# Patient Record
Sex: MALE | Race: WHITE | NOT HISPANIC OR LATINO | Employment: FULL TIME | URBAN - METROPOLITAN AREA
[De-identification: names, ages, dates, MRNs, and addresses within clinical notes are randomized per-mention and may not be internally consistent; named-entity substitution may affect disease eponyms.]

---

## 2019-02-26 ENCOUNTER — TRANSFERRED RECORDS (OUTPATIENT)
Dept: HEALTH INFORMATION MANAGEMENT | Facility: CLINIC | Age: 44
End: 2019-02-26

## 2019-05-31 ENCOUNTER — APPOINTMENT (OUTPATIENT)
Dept: GENERAL RADIOLOGY | Facility: CLINIC | Age: 44
End: 2019-05-31
Attending: EMERGENCY MEDICINE
Payer: COMMERCIAL

## 2019-05-31 ENCOUNTER — HOSPITAL ENCOUNTER (INPATIENT)
Facility: CLINIC | Age: 44
LOS: 2 days | Discharge: HOME | End: 2019-06-02
Attending: EMERGENCY MEDICINE | Admitting: INTERNAL MEDICINE
Payer: COMMERCIAL

## 2019-05-31 DIAGNOSIS — I21.19 ST ELEVATION MYOCARDIAL INFARCTION (STEMI) INVOLVING OTHER CORONARY ARTERY OF INFERIOR WALL (H): ICD-10-CM

## 2019-05-31 DIAGNOSIS — I21.11 STEMI INVOLVING RIGHT CORONARY ARTERY (H): Primary | ICD-10-CM

## 2019-05-31 PROBLEM — I21.3 STEMI (ST ELEVATION MYOCARDIAL INFARCTION) (H): Status: ACTIVE | Noted: 2019-05-31

## 2019-05-31 LAB
ALBUMIN SERPL-MCNC: 4.2 G/DL (ref 3.4–5)
ALP SERPL-CCNC: 89 U/L (ref 40–150)
ALT SERPL W P-5'-P-CCNC: 61 U/L (ref 0–70)
ANION GAP SERPL CALCULATED.3IONS-SCNC: 3 MMOL/L (ref 3–14)
AST SERPL W P-5'-P-CCNC: 33 U/L (ref 0–45)
BASOPHILS # BLD AUTO: 0 10E9/L (ref 0–0.2)
BASOPHILS NFR BLD AUTO: 0.6 %
BILIRUB SERPL-MCNC: 0.8 MG/DL (ref 0.2–1.3)
BUN SERPL-MCNC: 15 MG/DL (ref 7–30)
CALCIUM SERPL-MCNC: 8.6 MG/DL (ref 8.5–10.1)
CATH EF QUANTITATIVE: 65 %
CHLORIDE SERPL-SCNC: 107 MMOL/L (ref 94–109)
CHOLEST SERPL-MCNC: 219 MG/DL
CO2 SERPL-SCNC: 30 MMOL/L (ref 20–32)
CREAT SERPL-MCNC: 0.94 MG/DL (ref 0.66–1.25)
DIFFERENTIAL METHOD BLD: NORMAL
EOSINOPHIL # BLD AUTO: 0.2 10E9/L (ref 0–0.7)
EOSINOPHIL NFR BLD AUTO: 2.3 %
ERYTHROCYTE [DISTWIDTH] IN BLOOD BY AUTOMATED COUNT: 12.1 % (ref 10–15)
GFR SERPL CREATININE-BSD FRML MDRD: >90 ML/MIN/{1.73_M2}
GLUCOSE SERPL-MCNC: 86 MG/DL (ref 70–99)
HCT VFR BLD AUTO: 41.3 % (ref 40–53)
HDLC SERPL-MCNC: 37 MG/DL
HGB BLD-MCNC: 14.7 G/DL (ref 13.3–17.7)
IMM GRANULOCYTES # BLD: 0 10E9/L (ref 0–0.4)
IMM GRANULOCYTES NFR BLD: 0.3 %
INTERPRETATION ECG - MUSE: NORMAL
INTERPRETATION ECG - MUSE: NORMAL
KCT BLD-ACNC: 176 SEC (ref 75–150)
KCT BLD-ACNC: 221 SEC (ref 75–150)
KCT BLD-ACNC: 241 SEC (ref 75–150)
LDLC SERPL CALC-MCNC: 141 MG/DL
LYMPHOCYTES # BLD AUTO: 2.2 10E9/L (ref 0.8–5.3)
LYMPHOCYTES NFR BLD AUTO: 33.8 %
MCH RBC QN AUTO: 30.4 PG (ref 26.5–33)
MCHC RBC AUTO-ENTMCNC: 35.6 G/DL (ref 31.5–36.5)
MCV RBC AUTO: 85 FL (ref 78–100)
MONOCYTES # BLD AUTO: 0.5 10E9/L (ref 0–1.3)
MONOCYTES NFR BLD AUTO: 8.3 %
NEUTROPHILS # BLD AUTO: 3.5 10E9/L (ref 1.6–8.3)
NEUTROPHILS NFR BLD AUTO: 54.7 %
NONHDLC SERPL-MCNC: 182 MG/DL
NRBC # BLD AUTO: 0 10*3/UL
NRBC BLD AUTO-RTO: 0 /100
PLATELET # BLD AUTO: 254 10E9/L (ref 150–450)
POTASSIUM SERPL-SCNC: 4 MMOL/L (ref 3.4–5.3)
PROT SERPL-MCNC: 8 G/DL (ref 6.8–8.8)
RBC # BLD AUTO: 4.84 10E12/L (ref 4.4–5.9)
SODIUM SERPL-SCNC: 140 MMOL/L (ref 133–144)
TRIGL SERPL-MCNC: 203 MG/DL
TROPONIN I SERPL-MCNC: 0.06 UG/L (ref 0–0.04)
TROPONIN I SERPL-MCNC: 0.77 UG/L (ref 0–0.04)
WBC # BLD AUTO: 6.4 10E9/L (ref 4–11)

## 2019-05-31 PROCEDURE — C1894 INTRO/SHEATH, NON-LASER: HCPCS | Performed by: INTERNAL MEDICINE

## 2019-05-31 PROCEDURE — 84484 ASSAY OF TROPONIN QUANT: CPT | Performed by: INTERNAL MEDICINE

## 2019-05-31 PROCEDURE — 25000132 ZZH RX MED GY IP 250 OP 250 PS 637: Performed by: EMERGENCY MEDICINE

## 2019-05-31 PROCEDURE — 96375 TX/PRO/DX INJ NEW DRUG ADDON: CPT

## 2019-05-31 PROCEDURE — 80053 COMPREHEN METABOLIC PANEL: CPT | Performed by: EMERGENCY MEDICINE

## 2019-05-31 PROCEDURE — 25000128 H RX IP 250 OP 636: Performed by: INTERNAL MEDICINE

## 2019-05-31 PROCEDURE — 93005 ELECTROCARDIOGRAM TRACING: CPT

## 2019-05-31 PROCEDURE — 96374 THER/PROPH/DIAG INJ IV PUSH: CPT

## 2019-05-31 PROCEDURE — 80061 LIPID PANEL: CPT | Performed by: EMERGENCY MEDICINE

## 2019-05-31 PROCEDURE — 25000128 H RX IP 250 OP 636

## 2019-05-31 PROCEDURE — C1874 STENT, COATED/COV W/DEL SYS: HCPCS | Performed by: INTERNAL MEDICINE

## 2019-05-31 PROCEDURE — C1887 CATHETER, GUIDING: HCPCS | Performed by: INTERNAL MEDICINE

## 2019-05-31 PROCEDURE — 99153 MOD SED SAME PHYS/QHP EA: CPT | Performed by: INTERNAL MEDICINE

## 2019-05-31 PROCEDURE — 85347 COAGULATION TIME ACTIVATED: CPT

## 2019-05-31 PROCEDURE — 99223 1ST HOSP IP/OBS HIGH 75: CPT | Mod: AI | Performed by: INTERNAL MEDICINE

## 2019-05-31 PROCEDURE — 25800030 ZZH RX IP 258 OP 636: Performed by: INTERNAL MEDICINE

## 2019-05-31 PROCEDURE — 25000125 ZZHC RX 250: Performed by: EMERGENCY MEDICINE

## 2019-05-31 PROCEDURE — 4A023N7 MEASUREMENT OF CARDIAC SAMPLING AND PRESSURE, LEFT HEART, PERCUTANEOUS APPROACH: ICD-10-PCS | Performed by: INTERNAL MEDICINE

## 2019-05-31 PROCEDURE — 93458 L HRT ARTERY/VENTRICLE ANGIO: CPT | Performed by: INTERNAL MEDICINE

## 2019-05-31 PROCEDURE — 25000125 ZZHC RX 250: Performed by: INTERNAL MEDICINE

## 2019-05-31 PROCEDURE — 21000001 ZZH R&B HEART CARE

## 2019-05-31 PROCEDURE — 27210794 ZZH OR GENERAL SUPPLY STERILE: Performed by: INTERNAL MEDICINE

## 2019-05-31 PROCEDURE — 93005 ELECTROCARDIOGRAM TRACING: CPT | Mod: 76

## 2019-05-31 PROCEDURE — 85025 COMPLETE CBC W/AUTO DIFF WBC: CPT | Performed by: EMERGENCY MEDICINE

## 2019-05-31 PROCEDURE — 71045 X-RAY EXAM CHEST 1 VIEW: CPT

## 2019-05-31 PROCEDURE — 84484 ASSAY OF TROPONIN QUANT: CPT | Performed by: EMERGENCY MEDICINE

## 2019-05-31 PROCEDURE — B2151ZZ FLUOROSCOPY OF LEFT HEART USING LOW OSMOLAR CONTRAST: ICD-10-PCS | Performed by: INTERNAL MEDICINE

## 2019-05-31 PROCEDURE — 25000128 H RX IP 250 OP 636: Performed by: EMERGENCY MEDICINE

## 2019-05-31 PROCEDURE — 25000132 ZZH RX MED GY IP 250 OP 250 PS 637: Performed by: INTERNAL MEDICINE

## 2019-05-31 PROCEDURE — 25000125 ZZHC RX 250

## 2019-05-31 PROCEDURE — 99291 CRITICAL CARE FIRST HOUR: CPT | Mod: 25 | Performed by: INTERNAL MEDICINE

## 2019-05-31 PROCEDURE — C9606 PERC D-E COR REVASC W AMI S: HCPCS | Performed by: INTERNAL MEDICINE

## 2019-05-31 PROCEDURE — B2111ZZ FLUOROSCOPY OF MULTIPLE CORONARY ARTERIES USING LOW OSMOLAR CONTRAST: ICD-10-PCS | Performed by: INTERNAL MEDICINE

## 2019-05-31 PROCEDURE — 99152 MOD SED SAME PHYS/QHP 5/>YRS: CPT | Performed by: INTERNAL MEDICINE

## 2019-05-31 PROCEDURE — C1769 GUIDE WIRE: HCPCS | Performed by: INTERNAL MEDICINE

## 2019-05-31 PROCEDURE — 93010 ELECTROCARDIOGRAM REPORT: CPT | Performed by: INTERNAL MEDICINE

## 2019-05-31 PROCEDURE — C1725 CATH, TRANSLUMIN NON-LASER: HCPCS | Performed by: INTERNAL MEDICINE

## 2019-05-31 PROCEDURE — 36415 COLL VENOUS BLD VENIPUNCTURE: CPT | Performed by: INTERNAL MEDICINE

## 2019-05-31 PROCEDURE — 027034Z DILATION OF CORONARY ARTERY, ONE ARTERY WITH DRUG-ELUTING INTRALUMINAL DEVICE, PERCUTANEOUS APPROACH: ICD-10-PCS | Performed by: INTERNAL MEDICINE

## 2019-05-31 PROCEDURE — 99285 EMERGENCY DEPT VISIT HI MDM: CPT | Mod: 25

## 2019-05-31 DEVICE — STENT SYNERGY DRUG ELUTING 4.00X32MM  H7493926032400: Type: IMPLANTABLE DEVICE | Status: FUNCTIONAL

## 2019-05-31 RX ORDER — AMOXICILLIN 250 MG
2 CAPSULE ORAL 2 TIMES DAILY PRN
Status: DISCONTINUED | OUTPATIENT
Start: 2019-05-31 | End: 2019-06-02 | Stop reason: HOSPADM

## 2019-05-31 RX ORDER — MORPHINE SULFATE 2 MG/ML
1 INJECTION, SOLUTION INTRAMUSCULAR; INTRAVENOUS
Status: DISCONTINUED | OUTPATIENT
Start: 2019-05-31 | End: 2019-06-02 | Stop reason: HOSPADM

## 2019-05-31 RX ORDER — CARVEDILOL 12.5 MG/1
12.5 TABLET ORAL 2 TIMES DAILY
Status: DISCONTINUED | OUTPATIENT
Start: 2019-05-31 | End: 2019-06-01

## 2019-05-31 RX ORDER — IOPAMIDOL 755 MG/ML
INJECTION, SOLUTION INTRAVASCULAR
Status: DISCONTINUED | OUTPATIENT
Start: 2019-05-31 | End: 2019-05-31 | Stop reason: HOSPADM

## 2019-05-31 RX ORDER — ASPIRIN 81 MG/1
324 TABLET, CHEWABLE ORAL ONCE
Status: COMPLETED | OUTPATIENT
Start: 2019-05-31 | End: 2019-05-31

## 2019-05-31 RX ORDER — FENTANYL CITRATE 50 UG/ML
INJECTION, SOLUTION INTRAMUSCULAR; INTRAVENOUS
Status: DISCONTINUED | OUTPATIENT
Start: 2019-05-31 | End: 2019-05-31 | Stop reason: HOSPADM

## 2019-05-31 RX ORDER — ROSUVASTATIN CALCIUM 20 MG/1
40 TABLET, COATED ORAL DAILY
Status: DISCONTINUED | OUTPATIENT
Start: 2019-05-31 | End: 2019-06-02 | Stop reason: HOSPADM

## 2019-05-31 RX ORDER — FLUMAZENIL 0.1 MG/ML
0.2 INJECTION, SOLUTION INTRAVENOUS
Status: ACTIVE | OUTPATIENT
Start: 2019-05-31 | End: 2019-06-01

## 2019-05-31 RX ORDER — NITROGLYCERIN 0.4 MG/1
0.4 TABLET SUBLINGUAL EVERY 5 MIN PRN
Status: DISCONTINUED | OUTPATIENT
Start: 2019-05-31 | End: 2019-05-31

## 2019-05-31 RX ORDER — METOPROLOL TARTRATE 1 MG/ML
5 INJECTION, SOLUTION INTRAVENOUS ONCE
Status: COMPLETED | OUTPATIENT
Start: 2019-05-31 | End: 2019-05-31

## 2019-05-31 RX ORDER — POTASSIUM CL/LIDO/0.9 % NACL 10MEQ/0.1L
10 INTRAVENOUS SOLUTION, PIGGYBACK (ML) INTRAVENOUS
Status: DISCONTINUED | OUTPATIENT
Start: 2019-05-31 | End: 2019-06-02 | Stop reason: HOSPADM

## 2019-05-31 RX ORDER — LABETALOL 20 MG/4 ML (5 MG/ML) INTRAVENOUS SYRINGE
10
Status: DISCONTINUED | OUTPATIENT
Start: 2019-05-31 | End: 2019-06-02 | Stop reason: HOSPADM

## 2019-05-31 RX ORDER — ESCITALOPRAM OXALATE 10 MG/1
20 TABLET ORAL DAILY
Status: DISCONTINUED | OUTPATIENT
Start: 2019-06-01 | End: 2019-06-02 | Stop reason: HOSPADM

## 2019-05-31 RX ORDER — POLYETHYLENE GLYCOL 3350 17 G/17G
17 POWDER, FOR SOLUTION ORAL DAILY PRN
Status: DISCONTINUED | OUTPATIENT
Start: 2019-05-31 | End: 2019-06-02 | Stop reason: HOSPADM

## 2019-05-31 RX ORDER — HEPARIN SODIUM 1000 [USP'U]/ML
INJECTION, SOLUTION INTRAVENOUS; SUBCUTANEOUS
Status: DISCONTINUED | OUTPATIENT
Start: 2019-05-31 | End: 2019-05-31 | Stop reason: HOSPADM

## 2019-05-31 RX ORDER — POTASSIUM CHLORIDE 1.5 G/1.58G
20-40 POWDER, FOR SOLUTION ORAL
Status: DISCONTINUED | OUTPATIENT
Start: 2019-05-31 | End: 2019-06-02 | Stop reason: HOSPADM

## 2019-05-31 RX ORDER — POTASSIUM CHLORIDE 7.45 MG/ML
10 INJECTION INTRAVENOUS
Status: DISCONTINUED | OUTPATIENT
Start: 2019-05-31 | End: 2019-06-02 | Stop reason: HOSPADM

## 2019-05-31 RX ORDER — ACETAMINOPHEN 325 MG/1
650 TABLET ORAL EVERY 4 HOURS PRN
Status: DISCONTINUED | OUTPATIENT
Start: 2019-05-31 | End: 2019-06-02 | Stop reason: HOSPADM

## 2019-05-31 RX ORDER — ASPIRIN 81 MG/1
81 TABLET ORAL DAILY
Status: DISCONTINUED | OUTPATIENT
Start: 2019-06-01 | End: 2019-06-02 | Stop reason: HOSPADM

## 2019-05-31 RX ORDER — HYDROCODONE BITARTRATE AND ACETAMINOPHEN 5; 325 MG/1; MG/1
1-2 TABLET ORAL EVERY 4 HOURS PRN
Status: DISCONTINUED | OUTPATIENT
Start: 2019-05-31 | End: 2019-06-02 | Stop reason: HOSPADM

## 2019-05-31 RX ORDER — POTASSIUM CHLORIDE 1500 MG/1
20-40 TABLET, EXTENDED RELEASE ORAL
Status: DISCONTINUED | OUTPATIENT
Start: 2019-05-31 | End: 2019-06-02 | Stop reason: HOSPADM

## 2019-05-31 RX ORDER — METOPROLOL TARTRATE 1 MG/ML
INJECTION, SOLUTION INTRAVENOUS
Status: COMPLETED
Start: 2019-05-31 | End: 2019-05-31

## 2019-05-31 RX ORDER — METOPROLOL TARTRATE 1 MG/ML
INJECTION, SOLUTION INTRAVENOUS
Status: DISCONTINUED | OUTPATIENT
Start: 2019-05-31 | End: 2019-05-31 | Stop reason: HOSPADM

## 2019-05-31 RX ORDER — NITROGLYCERIN 5 MG/ML
VIAL (ML) INTRAVENOUS
Status: DISCONTINUED | OUTPATIENT
Start: 2019-05-31 | End: 2019-05-31 | Stop reason: HOSPADM

## 2019-05-31 RX ORDER — LISINOPRIL 10 MG/1
10 TABLET ORAL
Status: DISCONTINUED | OUTPATIENT
Start: 2019-05-31 | End: 2019-06-01

## 2019-05-31 RX ORDER — AMOXICILLIN 250 MG
1 CAPSULE ORAL 2 TIMES DAILY PRN
Status: DISCONTINUED | OUTPATIENT
Start: 2019-05-31 | End: 2019-06-02 | Stop reason: HOSPADM

## 2019-05-31 RX ORDER — NALOXONE HYDROCHLORIDE 0.4 MG/ML
.1-.4 INJECTION, SOLUTION INTRAMUSCULAR; INTRAVENOUS; SUBCUTANEOUS
Status: DISCONTINUED | OUTPATIENT
Start: 2019-05-31 | End: 2019-05-31

## 2019-05-31 RX ORDER — OXYCODONE HYDROCHLORIDE 5 MG/1
5-10 TABLET ORAL
Status: DISCONTINUED | OUTPATIENT
Start: 2019-05-31 | End: 2019-05-31

## 2019-05-31 RX ORDER — NALOXONE HYDROCHLORIDE 0.4 MG/ML
.2-.4 INJECTION, SOLUTION INTRAMUSCULAR; INTRAVENOUS; SUBCUTANEOUS
Status: DISCONTINUED | OUTPATIENT
Start: 2019-05-31 | End: 2019-05-31

## 2019-05-31 RX ORDER — POTASSIUM CHLORIDE 29.8 MG/ML
20 INJECTION INTRAVENOUS
Status: DISCONTINUED | OUTPATIENT
Start: 2019-05-31 | End: 2019-05-31

## 2019-05-31 RX ORDER — VERAPAMIL HYDROCHLORIDE 2.5 MG/ML
INJECTION, SOLUTION INTRAVENOUS
Status: DISCONTINUED | OUTPATIENT
Start: 2019-05-31 | End: 2019-05-31 | Stop reason: HOSPADM

## 2019-05-31 RX ORDER — FENTANYL CITRATE 50 UG/ML
25-50 INJECTION, SOLUTION INTRAMUSCULAR; INTRAVENOUS
Status: ACTIVE | OUTPATIENT
Start: 2019-05-31 | End: 2019-06-01

## 2019-05-31 RX ORDER — ONDANSETRON 4 MG/1
4 TABLET, ORALLY DISINTEGRATING ORAL EVERY 6 HOURS PRN
Status: DISCONTINUED | OUTPATIENT
Start: 2019-05-31 | End: 2019-06-02 | Stop reason: HOSPADM

## 2019-05-31 RX ORDER — ONDANSETRON 2 MG/ML
4 INJECTION INTRAMUSCULAR; INTRAVENOUS EVERY 6 HOURS PRN
Status: DISCONTINUED | OUTPATIENT
Start: 2019-05-31 | End: 2019-06-02 | Stop reason: HOSPADM

## 2019-05-31 RX ORDER — ATROPINE SULFATE 0.1 MG/ML
0.5 INJECTION INTRAVENOUS EVERY 5 MIN PRN
Status: ACTIVE | OUTPATIENT
Start: 2019-05-31 | End: 2019-06-01

## 2019-05-31 RX ORDER — NALOXONE HYDROCHLORIDE 0.4 MG/ML
.1-.4 INJECTION, SOLUTION INTRAMUSCULAR; INTRAVENOUS; SUBCUTANEOUS
Status: DISCONTINUED | OUTPATIENT
Start: 2019-05-31 | End: 2019-06-02 | Stop reason: HOSPADM

## 2019-05-31 RX ORDER — SODIUM CHLORIDE 9 MG/ML
INJECTION, SOLUTION INTRAVENOUS CONTINUOUS
Status: ACTIVE | OUTPATIENT
Start: 2019-05-31 | End: 2019-06-01

## 2019-05-31 RX ORDER — PROCHLORPERAZINE MALEATE 5 MG
10 TABLET ORAL EVERY 6 HOURS PRN
Status: DISCONTINUED | OUTPATIENT
Start: 2019-05-31 | End: 2019-06-02 | Stop reason: HOSPADM

## 2019-05-31 RX ORDER — PROCHLORPERAZINE 25 MG
25 SUPPOSITORY, RECTAL RECTAL EVERY 12 HOURS PRN
Status: DISCONTINUED | OUTPATIENT
Start: 2019-05-31 | End: 2019-06-02 | Stop reason: HOSPADM

## 2019-05-31 RX ORDER — NITROGLYCERIN 0.4 MG/1
0.4 TABLET SUBLINGUAL EVERY 5 MIN PRN
Status: DISCONTINUED | OUTPATIENT
Start: 2019-05-31 | End: 2019-06-02 | Stop reason: HOSPADM

## 2019-05-31 RX ADMIN — HEPARIN SODIUM 1000 UNITS/HR: 10000 INJECTION, SOLUTION INTRAVENOUS at 18:11

## 2019-05-31 RX ADMIN — ROSUVASTATIN CALCIUM 40 MG: 20 TABLET, FILM COATED ORAL at 20:57

## 2019-05-31 RX ADMIN — LISINOPRIL 10 MG: 10 TABLET ORAL at 20:57

## 2019-05-31 RX ADMIN — ASPIRIN 81 MG 324 MG: 81 TABLET ORAL at 17:44

## 2019-05-31 RX ADMIN — METOPROLOL TARTRATE 5 MG: 5 INJECTION, SOLUTION INTRAVENOUS at 18:11

## 2019-05-31 RX ADMIN — HEPARIN SODIUM 7000 UNITS: 1000 INJECTION, SOLUTION INTRAVENOUS; SUBCUTANEOUS at 17:51

## 2019-05-31 RX ADMIN — CARVEDILOL 12.5 MG: 12.5 TABLET, FILM COATED ORAL at 20:57

## 2019-05-31 RX ADMIN — METOPROLOL TARTRATE 5 MG: 1 INJECTION, SOLUTION INTRAVENOUS at 18:00

## 2019-05-31 RX ADMIN — TICAGRELOR 180 MG: 90 TABLET ORAL at 17:49

## 2019-05-31 RX ADMIN — METOPROLOL TARTRATE 5 MG: 5 INJECTION, SOLUTION INTRAVENOUS at 18:00

## 2019-05-31 ASSESSMENT — ACTIVITIES OF DAILY LIVING (ADL)
COGNITION: 0 - NO COGNITION ISSUES REPORTED
TRANSFERRING: 0-->INDEPENDENT
TOILETING: 0-->INDEPENDENT
AMBULATION: 0-->INDEPENDENT
SWALLOWING: 0-->SWALLOWS FOODS/LIQUIDS WITHOUT DIFFICULTY
BATHING: 0-->INDEPENDENT
RETIRED_EATING: 0-->INDEPENDENT
FALL_HISTORY_WITHIN_LAST_SIX_MONTHS: NO
DRESS: 0-->INDEPENDENT
RETIRED_COMMUNICATION: 0-->UNDERSTANDS/COMMUNICATES WITHOUT DIFFICULTY

## 2019-05-31 ASSESSMENT — MIFFLIN-ST. JEOR: SCORE: 1925.04

## 2019-05-31 ASSESSMENT — ENCOUNTER SYMPTOMS
COUGH: 0
NAUSEA: 0
SHORTNESS OF BREATH: 0
CHEST TIGHTNESS: 1
DIAPHORESIS: 0

## 2019-05-31 NOTE — CONSULTS
[facility]    Cardiology Consultation     Date of Admission:  5/31/2019  Date of Consult (When I saw the patient): 05/31/19    Assessment & Plan   Nathan Willis is a 43 year old male who was admitted on 5/31/2019.    1-acute inferior ST elevation myocardial infarction.  Patient is good candidate for emergent catheterization and revascularization as indicated.  I discussed this with him in detail.  He would like to proceed with this.  He is an appropriate candidate for a drug-eluting stent.  There are no bleeding history and no trauma history.  I have discussed with Dr. Gómez in the Cath Lab and he will proceed immediately    Santos Brewer M.D.    Primary Care Physician   [facility]    Reason for Consult   Reason for consult: I was asked by Dr. Hough to evaluate this patient urgently for acute myocardial infarction.    History of Present Illness   Nathan Willis is a 43 year old male who presents with chest pain and ST elevation.  No prior cardiac history and otherwise generally healthy.  Has had some vague bilateral arm pain for the last day or 2 did not really pay attention to it and when he was not paying attention he did not notice it.  Today started noticing mid upper chest discomfort which again was generally mild but he would notice it and then after a while would get better and he is not sure if it went away completely or if he just did not notice it.  However this afternoon late it seemed to become more persistent and more prominent especially when he was exercising.  He decided he needed to get it evaluated.  On presentation to the emergency room his symptoms were waning in the EKG, which I personally reviewed, suggested some early inferior ST elevation.  His symptoms then worsened and a repeat EKG shows clear inferior ST segment elevation with lateral reciprocal ST segment depression.  His symptoms continue to wax and wane at this time.  Up until today he really is felt well.  He has  had no exercise limitations, no exertional chest discomfort, no orthopnea PND or edema.  No history of palpitations dizziness or syncope.  Denies a history of cardiac issues or heart murmur.  No history of diabetes, hypertension, dyslipidemia, tobacco, chronic inflammatory conditions, renal insufficiency.  There is no bleeding history no trauma history.  His parents  relatively young of cancer and did not have heart disease as far as he knows.  His siblings are alive and well without heart disease history    Patient Active Problem List   Diagnosis     Anxiety and depression       Past Medical History   I have reviewed this patient's medical history and updated it with pertinent information if needed.   Past Medical History:   Diagnosis Date     Anxiety and depression     Dr. Nicole Soler       Past Surgical History   I have reviewed this patient's surgical history and updated it with pertinent information if needed.  Past Surgical History:   Procedure Laterality Date     wisdom teeth removed  college       Prior to Admission Medications   Prior to Admission Medications   Prescriptions Last Dose Informant Patient Reported? Taking?   escitalopram (LEXAPRO) 20 MG tablet   Yes No   Sig: Take 1 tablet (20 mg) by mouth daily      Facility-Administered Medications: None     Current Facility-Administered Medications   Medication Dose Route Frequency     Current Facility-Administered Medications   Medication Last Rate     HEParin 1,000 Units/hr (19)     Allergies   No Known Allergies    Social History    reports that he has never smoked. He has never used smokeless tobacco. He reports that he drinks alcohol. He reports that he does not use drugs.    Family History   Family History   Problem Relation Age of Onset     Hypertension Father        Review of Systems   The 10 point Review of Systems is negative other than noted in the HPI or here.     Physical Exam   Vital Signs with Ranges  Temp:  [97.8  F  "(36.6  C)] 97.8  F (36.6  C)  Pulse:  [77-96] 77  Heart Rate:  [] 76  Resp:  [0-20] 0  BP: (160-171)/(108-120) 160/108  SpO2:  [95 %-99 %] 95 %  Vitals:    05/31/19 1718   Weight: 99.8 kg (220 lb)     No intake/output data recorded.    Vitals: BP (!) 160/108   Pulse 77   Temp 97.8  F (36.6  C) (Oral)   Resp (!) 0   Ht 1.803 m (5' 11\")   Wt 99.8 kg (220 lb)   SpO2 95%   BMI 30.68 kg/m      Constitutional: No acute or respiratory distress     Skin: Clear    Head/Eyes/ENT:   No cyanosis, icterus, pallor.  Throat clear, Mallampati 2    Neck:   Normal carotid upstrokes without bruits.  No masses    Chest:   Clear.    Cardiac: Regular rhythm.  Normal S1 and S2 without gallop.  There is a grade 2/6 apical near holosystolic murmur also heard out towards the anterior axillary line.  No JVD    Abdomen:   Soft, nontender, no organomegaly or bruit    Vascular: Pulses are all intact.  No femoral bruits.    Extremities and Back:   No cyanosis, erythema, edema, deformities    Neurological:   Alert and oriented x3.  Extraocular motions intact.  Pupils equal.  Symmetrical facies and fluent speech.  Upper and lower motor strength and tone are intact symmetrical and nonfocal    Recent Labs   Lab 05/31/19  1735   TROPI 0.064*       Recent Labs   Lab 05/31/19  1735   WBC 6.4   HGB 14.7   MCV 85         POTASSIUM 4.0   CHLORIDE 107   CO2 30   BUN 15   CR 0.94   GFRESTIMATED >90   GFRESTBLACK >90   ANIONGAP 3   NICOLE 8.6   GLC 86   ALBUMIN 4.2   PROTTOTAL 8.0   BILITOTAL 0.8   ALKPHOS 89   ALT 61   AST 33   TROPI 0.064*       Imaging:  Recent Results (from the past 48 hour(s))   Chest  XR, 1 view PORTABLE    Narrative    CHEST SINGLE VIEW PORTABLE   5/31/2019 5:51 PM     HISTORY: Chest pain.    COMPARISON: None.    FINDINGS: The lungs are clear. Normal-sized cardiac silhouette.      Impression    IMPRESSION: No evidence of active cardiopulmonary disease.     30 minutes of critical care time so far today.  "

## 2019-05-31 NOTE — ED TRIAGE NOTES
Intermittent central CP since this morning. C/o both arms feeling tight at times. No SOB, nausea or diaphoresis.

## 2019-05-31 NOTE — Clinical Note
Stent deployed in the middle right coronary artery. Max pressure = 16 zoie. Total duration = 30 seconds.

## 2019-05-31 NOTE — ED PROVIDER NOTES
History     Chief Complaint:  Chest Pain      HPI   Nathan Willis is a 43 year old male who presents with intermittent chest pain since this morning. He describes it as a tightness located in the middle of his chest and small amount in his back. When he has episodes of pain they last for about a few minutes. He denies aggravating factors for this pain but it was present when he was on the elliptical. He notes if he is fixated on the pain, it may last longer. He is not having the pain right now, but when it is present he reports it is a 5/10 on the pain scale. Patient notes he was able to last a full day at work. Patient also endorses tightness in his arms intermittently for the past two days. Patient denies shortness of breath, nausea, leg swelling and diaphoresis. Cough and cold symptoms are also denied by the patient. His parents  in their 40s of cancer.     Cardiac Risk Factors   Sex: Female   Tobacco: Negative   Hypertension: Negative  Diabetes: Negative  Hyperlipidemia: Slightly elevated in the past.  Family History: Negative    Allergies:  No known drug allergies.    Medications:    Lexapro     Past Medical History:    Anxiety  Depression    Past Surgical History:    [place] teeth removal    Family History:    HTN    Social History:  Presents to the ED by himself.   Tobacco Use: Never  Alcohol Use: Occasionally   PCP: [facility]  Marital Status:   [2]     Review of Systems   Constitutional: Negative for diaphoresis.   Respiratory: Positive for chest tightness. Negative for cough and shortness of breath.    Cardiovascular: Positive for chest pain. Negative for leg swelling.   Gastrointestinal: Negative for nausea.   Musculoskeletal:        Positive for intermittent upper extremity tightness.    All other systems reviewed and are negative.      Physical Exam   First Vitals:  Patient Vitals for the past 24 hrs:   BP Temp Temp src Pulse Heart Rate Resp SpO2 Height Weight   19 1815 (!) 160/108  "-- -- 77 76 (!) 0 95 % -- --   05/31/19 1814 -- -- -- -- 80 16 99 % -- --   05/31/19 1810 -- -- -- -- 75 (!) 7 98 % -- --   05/31/19 1805 (!) 171/114 -- -- 82 82 19 98 % -- --   05/31/19 1801 -- -- -- -- 101 10 99 % -- --   05/31/19 1800 (!) 169/120 -- -- 96 101 13 99 % -- --   05/31/19 1718 (!) 167/109 97.8  F (36.6  C) Oral 89 -- 20 99 % 1.803 m (5' 11\") 99.8 kg (220 lb)       Physical Exam   Nursing note and vitals reviewed.  Constitutional:  Appears well-developed and well-nourished.   HENT:   Head:    Atraumatic.   Mouth/Throat:   Oropharynx is clear and moist. No oropharyngeal exudate.   Eyes:    Pupils are equal, round, and reactive to light.   Neck:    Normal range of motion. Neck supple.      No tracheal deviation present. No thyromegaly present.   Cardiovascular:  Normal rate, regular rhythm, no murmur   Pulmonary/Chest: Breath sounds are clear and equal without wheezes or crackles.  Abdominal:   Soft. Bowel sounds are normal. Exhibits no distension and      no mass. There is no tenderness.      There is no rebound and no guarding.   Musculoskeletal:  Exhibits no edema.   Lymphadenopathy:  No cervical adenopathy.   Neurological:   Alert and oriented to person, place, and time.   Skin:    Skin is warm and dry. No rash noted. No pallor.     Emergency Department Course   ECG:  @ 1714  Indication: Chest pain  Vent. Rate 87 bpm. IA interval 200 ms. QRS duration 94 ms. QT/QTc 376/452 ms. P-R-T axis 49 -19 63.   Normal sinus rhythm. Nonspecific ST-T wave abnormality. Abnormal ECG.    Read @ 1726 by Dr. Hough.    @ 1649  Indication: Repeat  Vent. Rate 81 bpm. IA interval 208 ms. QRS duration 102 ms. QT/QTc 368/427 ms. P-R-T axis 29 -22 57.   Normal sinus rhythm. LVH with repolarization. ST elevation, inferior wall STEMI. Abnormal ECG.    Read @ 3334 by Dr. Hough.    Imaging:  Radiographic findings were communicated with the patient who voiced understanding of the findings.    Chest  XR, 1 view PORTABLE "   Preliminary Result   IMPRESSION: No evidence of active cardiopulmonary disease.        Laboratory:  CBC:  WBC 6.4, HGB 14.7, , otherwise WNL  CMP: WNL (Creatinine 0.94)  Troponin: 0.064 (H)    Interventions:  1744: Aspirin, 324 mg, oral  1749: Brilinta, 180 mg, oral  1751: Heparin, 7000 units, IV injection  1800: Metoprolol, 5 mg, IV injection  1811: Metoprolol, 5 mg, IV injection  1811: Heparin, 1000 units/hr, IV injection    Emergency Department Course:  The patient arrived in triage where vitals were measured and recorded.   The patient was then escorted back to the emergency department.   1718: First EKG performed.   The patient's medical records were reviewed.  Nursing notes and vitals were reviewed.  1730: I performed an exam of the patient as documented above.  1740: Second EKG performed.   1741: Cath lab activated.   1743: I rechecked the patient and discussed results.  1745: Patient underwent portable chest XR.  The above workup was undertaken.  1749: I talked with Dr. Sánchez of interventional cardiology.  Findings and plan explained to the patient who consents to admission.   1753: I discussed the patient with Dr. Brewer of interventional cardiology, who will admit the patient to the cath lab for further monitoring, evaluation, and treatment.    Impression & Plan      CMS Diagnoses: The patient has a STEMI     The patient was evaluated at 1730   Patient was transferred  to the cath lab which was activated due to ECG changes.   ASA given in the ER.  Medical Decision Making:  I found this patient to have an acute inferior wall ST elevation myocardial infarction. His initial EKG showed very subtle ST elevations inferiorly and subtle ST depression in aVL. I repeated an EKG which showed a more obvious ST elevation MI. The cath lab was activated and he was taken to the cath lab by the interventional cardiologist for coronary angiogram. He was given heparin bolus and drip, aspirin and Brilinta here in  the ED as well as metoprolol for elevated blood pressure. His chest XR is normal with a normal mediastinal shadow and I did not feel there was consider for aortic dissection. I considered possible pericarditis in the differential but his symptoms seemed unlikely for this.     Critical Care time:  was 35 minutes for this patient excluding procedures.    Diagnosis:    ICD-10-CM    1. ST elevation myocardial infarction (STEMI) involving other coronary artery of inferior wall (H) I21.19        Disposition:  Admitted to the cath lab.         Mikki VILLASEÑOR, am serving as a scribe on 5/31/2019 at 5:30 PM to personally document services performed by Dr. Hough based on my observations and the provider's statements to me.    EMERGENCY DEPARTMENT     Bambi Hough MD  06/01/19 0131

## 2019-05-31 NOTE — Clinical Note
The first balloon was inserted into the right coronary artery and middle right coronary artery.Max pressure = 6 zoie. Total duration = 21 seconds.      Balloon reinflated a second time:

## 2019-06-01 ENCOUNTER — APPOINTMENT (OUTPATIENT)
Dept: OCCUPATIONAL THERAPY | Facility: CLINIC | Age: 44
End: 2019-06-01
Attending: INTERNAL MEDICINE
Payer: COMMERCIAL

## 2019-06-01 ENCOUNTER — APPOINTMENT (OUTPATIENT)
Dept: CARDIOLOGY | Facility: CLINIC | Age: 44
End: 2019-06-01
Attending: INTERNAL MEDICINE
Payer: COMMERCIAL

## 2019-06-01 LAB
ANION GAP SERPL CALCULATED.3IONS-SCNC: 5 MMOL/L (ref 3–14)
BUN SERPL-MCNC: 12 MG/DL (ref 7–30)
CALCIUM SERPL-MCNC: 8.4 MG/DL (ref 8.5–10.1)
CHLORIDE SERPL-SCNC: 109 MMOL/L (ref 94–109)
CO2 SERPL-SCNC: 26 MMOL/L (ref 20–32)
CREAT SERPL-MCNC: 0.82 MG/DL (ref 0.66–1.25)
ERYTHROCYTE [DISTWIDTH] IN BLOOD BY AUTOMATED COUNT: 12.1 % (ref 10–15)
GFR SERPL CREATININE-BSD FRML MDRD: >90 ML/MIN/{1.73_M2}
GLUCOSE SERPL-MCNC: 91 MG/DL (ref 70–99)
HCT VFR BLD AUTO: 39.3 % (ref 40–53)
HGB BLD-MCNC: 14 G/DL (ref 13.3–17.7)
MCH RBC QN AUTO: 30.2 PG (ref 26.5–33)
MCHC RBC AUTO-ENTMCNC: 35.6 G/DL (ref 31.5–36.5)
MCV RBC AUTO: 85 FL (ref 78–100)
PLATELET # BLD AUTO: 231 10E9/L (ref 150–450)
POTASSIUM SERPL-SCNC: 3.7 MMOL/L (ref 3.4–5.3)
RBC # BLD AUTO: 4.64 10E12/L (ref 4.4–5.9)
SODIUM SERPL-SCNC: 140 MMOL/L (ref 133–144)
TROPONIN I SERPL-MCNC: 1.76 UG/L (ref 0–0.04)
TROPONIN I SERPL-MCNC: 2.48 UG/L (ref 0–0.04)
TROPONIN I SERPL-MCNC: 2.66 UG/L (ref 0–0.04)
WBC # BLD AUTO: 7 10E9/L (ref 4–11)

## 2019-06-01 PROCEDURE — 99232 SBSQ HOSP IP/OBS MODERATE 35: CPT | Mod: 25 | Performed by: INTERNAL MEDICINE

## 2019-06-01 PROCEDURE — 40000264 ECHOCARDIOGRAM COMPLETE

## 2019-06-01 PROCEDURE — 25000132 ZZH RX MED GY IP 250 OP 250 PS 637: Performed by: INTERNAL MEDICINE

## 2019-06-01 PROCEDURE — 36415 COLL VENOUS BLD VENIPUNCTURE: CPT | Performed by: INTERNAL MEDICINE

## 2019-06-01 PROCEDURE — 84484 ASSAY OF TROPONIN QUANT: CPT | Performed by: INTERNAL MEDICINE

## 2019-06-01 PROCEDURE — 97110 THERAPEUTIC EXERCISES: CPT | Mod: GO

## 2019-06-01 PROCEDURE — 80048 BASIC METABOLIC PNL TOTAL CA: CPT | Performed by: INTERNAL MEDICINE

## 2019-06-01 PROCEDURE — 83695 ASSAY OF LIPOPROTEIN(A): CPT | Performed by: INTERNAL MEDICINE

## 2019-06-01 PROCEDURE — 85027 COMPLETE CBC AUTOMATED: CPT | Performed by: INTERNAL MEDICINE

## 2019-06-01 PROCEDURE — 97165 OT EVAL LOW COMPLEX 30 MIN: CPT | Mod: GO

## 2019-06-01 PROCEDURE — 93306 TTE W/DOPPLER COMPLETE: CPT | Mod: 26 | Performed by: INTERNAL MEDICINE

## 2019-06-01 PROCEDURE — 99232 SBSQ HOSP IP/OBS MODERATE 35: CPT | Performed by: INTERNAL MEDICINE

## 2019-06-01 PROCEDURE — 25500064 ZZH RX 255 OP 636: Performed by: INTERNAL MEDICINE

## 2019-06-01 PROCEDURE — 21000001 ZZH R&B HEART CARE

## 2019-06-01 RX ORDER — LISINOPRIL 20 MG/1
20 TABLET ORAL
Status: DISCONTINUED | OUTPATIENT
Start: 2019-06-01 | End: 2019-06-02 | Stop reason: HOSPADM

## 2019-06-01 RX ADMIN — HUMAN ALBUMIN MICROSPHERES AND PERFLUTREN 9 ML: 10; .22 INJECTION, SOLUTION INTRAVENOUS at 12:03

## 2019-06-01 RX ADMIN — TICAGRELOR 90 MG: 90 TABLET ORAL at 17:52

## 2019-06-01 RX ADMIN — CARVEDILOL 18.75 MG: 6.25 TABLET, FILM COATED ORAL at 21:00

## 2019-06-01 RX ADMIN — ESCITALOPRAM OXALATE 20 MG: 10 TABLET ORAL at 09:45

## 2019-06-01 RX ADMIN — CARVEDILOL 12.5 MG: 12.5 TABLET, FILM COATED ORAL at 09:45

## 2019-06-01 RX ADMIN — LISINOPRIL 20 MG: 20 TABLET ORAL at 15:23

## 2019-06-01 RX ADMIN — LISINOPRIL 10 MG: 10 TABLET ORAL at 09:45

## 2019-06-01 RX ADMIN — ASPIRIN 81 MG: 81 TABLET, COATED ORAL at 09:44

## 2019-06-01 RX ADMIN — TICAGRELOR 90 MG: 90 TABLET ORAL at 06:56

## 2019-06-01 RX ADMIN — ROSUVASTATIN CALCIUM 40 MG: 20 TABLET, FILM COATED ORAL at 09:44

## 2019-06-01 RX ADMIN — ACETAMINOPHEN 650 MG: 325 TABLET, FILM COATED ORAL at 18:31

## 2019-06-01 ASSESSMENT — ACTIVITIES OF DAILY LIVING (ADL): PREVIOUS_RESPONSIBILITIES: MEAL PREP;HOUSEKEEPING;LAUNDRY;SHOPPING;YARDWORK;MEDICATION MANAGEMENT;FINANCES;DRIVING;WORK

## 2019-06-01 ASSESSMENT — MIFFLIN-ST. JEOR: SCORE: 1954.53

## 2019-06-01 NOTE — H&P
Admitted:     2019      PRIMARY CARE PHYSICIAN:  Dr. [facility].      CHIEF COMPLAINT:  Chest pain.      HISTORY OF PRESENT ILLNESS:  Mr. Chacorta Willis is a delightful 43-year-old  gentleman with a past medical history notable for depression and anxiety who presented to the Emergency Department at [facility] earlier today with precordial chest pain described as squeezing discomfort with a severity of 5 to 6/10, ongoing intermittently for the entire day.  The patient also reported bilateral arm discomfort for the past 2 days.  In the Emergency Department, the electrocardiogram revealed normal sinus rhythm; however, ST elevation in inferior leads.  Troponin I was elevated at 0.064, consistent with ST elevation myocardial infarction.  The chest x-ray showed no active cardiopulmonary disease.  Cardiac catheterization lab was activated and the patient underwent urgent coronary angiogram with Dr. Gómez.  A coronary angiogram revealed 90% mid RCA lesion, 50% mid-LAD lesion, and 30% stenosis of second obtuse marginal. He underwent PCI with single drug-eluting stent to the RCA with 0% residual stenosis.  LV function was hyperdynamic with EF of 60% -65%.  Following the PCI, the patient has been admitted to CICU.  Hospitalist Service will be the primary service.     At this time, the patient is chest pain-free and reports no shortness of breath, palpitations, nausea, vomiting or other complaints.      PAST MEDICAL HISTORY:  Depression/anxiety.      PAST SURGICAL HISTORY:  Extraction of wisdom teeth.      FAMILY HISTORY:  Father  of tongue cancer at age of 44.  Mother  of brain cancer at the age of 46. Family history is negative for premature coronary artery disease.      SOCIAL HISTORY:  The patient is  and works as a .  He denies smoking or using recreational drugs.  He on average drinks 2 alcoholic beverages a week.      MEDICATIONS PRIOR TO ADMISSIONS:  Lexapro 20 mg  p.o. 2 daily.      ALLERGIES:  NO KNOWN DRUG ALLERGIES.      REVIEW OF SYSTEMS:  A 10-point review of system was performed thoroughly and was negative except as stated in history of present illness.      PHYSICAL EXAMINATION:   GENERAL:  This patient is a very pleasant middle-aged gentleman who is in no acute distress.   VITAL SIGNS:  Blood pressure 149/113, heart rate 67, respiratory rate 16, temperature 98.2 degrees Fahrenheit, oxygen saturation 97% on room air.   HEENT:  The pupils are round, equal, reactive to light bilaterally.  There is no conjunctival pallor or scleral icterus.  The oral mucosa appears moist.   NECK:  Supple.  There is no elevated JVP.   LUNGS:  Clear to auscultation bilaterally.   CARDIOVASCULAR:  There is normal S1 and S2 with regular rate and rhythm.   ABDOMEN:  Soft, nontender, nondistended.  Bowel sounds present.   EXTREMITIES:  There is no calf tenderness or lower extremity edema present.   SKIN:  There is no jaundice, cyanosis or acute rash.   NEUROLOGIC:  He is awake, alert, oriented x3.  There is no focal deficit on gross examination.      LABORATORY DATA:   1.  Chemistry profile:  Sodium 140, potassium 4, creatinine 0.94, ALT 61, troponin I 0.064, glucose 86.   2.  Hematology profile:  White count 6.4, hemoglobin 14.7.   3.  Chest x-ray shows no active cardiopulmonary disease.      ASSESSMENT AND PLAN:  Mr. Chacorta Willis is a 43-year-old  gentleman with a past medical history notable for depression/anxiety, who presented with an acute onset of precordial chest pain with the findings of inferior ST elevation myocardial infarction.  He was taken urgently to the Cath Lab and underwent PCI with stent to RCA.  He is admitted to the Hospitalist Service.   1.  Acute inferior ST elevation myocardial infarction:  The patient presented with acute onset of precordial chest pain.  The electrocardiogram revealed ST elevation in inferior leads.  Troponin I was mildly elevated at 0.064. The  CXR was negative for active cardiopulmonary disease.  Hematology and chemistry profile were essentially unremarkable.  He was taken urgently to the Cath Lab.  Angiogram revealed 90% stenosis of mid RCA, 30% stenosis of second obtuse marginal artery and 50% mid-LAD lesion.  PCI was performed with a drug-eluting stent to the mid RCA.  LV function was hyperdynamic with EF of 60% to 65%.  He is currently asymptomatic and chest pain-free.  Management as below.   a.  Telemetry.     b.  Serial troponin I.   c.  Echocardiogram.   d.  Continue aspirin 81 mg p.o. daily, Brilinta 90 mg p.o. b.i.d., lisinopril 10 mg p.o. daily, Carvedilol 12.5 mg p.o. b.i.d., and Crestor 40 mg p.o. daily, started by Cardiology Service.   e.  Check fasting profile and lipoprotein A.   f.  Nitroglycerin p.r.n. available as well as analgesics and narcotics.   g.  Cardiac rehab.  h.  Further management per Cardiology Service.     2.  Hypertension.  The blood pressure is moderately elevated, which could be reactive.  The patient has no prior history of hypertension.  He has been started on lisinopril 10 mg p.o. daily and carvedilol 12.5 mg p.o. b.i.d. as above, which can be up titrated if needed.  In addition, I will have IV labetalol available p.r.n.     3.  History of depression/anxiety.  Stable.  He will continue on prior to admission Lexapro 20 mg p.o. daily.     4.  Deep venous thrombosis prophylaxis:  Pneumatic compression device.     5.  Code status:  Full code.     6.  Disposition:  Anticipate discharge within 1-2 days.       I would like to thank Dr. [facility] Edge for allowing the Hospitalist Service to participate in the care of this patient.         CHARLEEN BROOKE MD             D: 2019   T: 2019   MT: VICENTE      Name:     VIRGINIA RIBEIRO   MRN:      [id number]        Account:      [id number]   :      1975        Admitted:     2019                   Document: [id number]       cc: [facility] MD

## 2019-06-01 NOTE — PLAN OF CARE
Discharge Planner OT   Patient plan for discharge: home     Current status: order received, chart reviewed, eval completed, tx initiated. Pt seen by CR following STEMI with PCI to RCA. Pt lives in house with spouse and 3 children and reports IND with ADL/IADL's including work and driving.    Pt ambulated on treadmill x 20 minutes at 2-2.5 MPH, tolerates well. Hypertensive BP at rest, 157/110. BP same following exercise 157/110. Provided educational handouts on MI.     Barriers to return to prior living situation: none     Recommendations for discharge: home with OP CR at [facility]    Rationale for recommendations: Pt will benefit from OP CR for progressive monitored physical activity for overall cardiac health.          Entered by: Keely Maynard 06/01/2019 10:47 AM

## 2019-06-01 NOTE — PROGRESS NOTES
[facility]    Cardiology Progress Note     Assessment & Plan   Nathan Willis is a 43 year old male who was admitted on 5/31/2019.  Cardiology was consulted for recommendations regarding acute STEMI.    1.  Acute inferior STEMI status post PCI of the RCA  2.  Moderate residual mid LAD stenosis and mild stenosis of the second obtuse marginal, plan for medical management  3.  Hypertension  4.  Hyperlipidemia    Doing well this morning.  Echocardiogram today to assess LV function.  We did discuss the possibility of myocardial stunning after acute MI and that his ejection fraction may improve with time.  We will obtain a repeat troponin this afternoon to ensure it is peaked and now downtrending.  No significant arrhythmia events thus far though we will plan to observe for 48 hours to ensure no life threatening arrhythmia events.    He is currently on carvedilol 12.5 mg twice a day and lisinopril 10 mg daily.  He will continue these medications at current doses unless additional hypertensive support is necessary.  Continue on aspirin and Brilinta for his recent PCI and Crestor 40 mg for dyslipidemia.    We will plan for dismissal from the hospital tomorrow if no major issues.  Cardiology will continue to follow along.  Please page with any questions or concerns.    Jairo Frey MD    ADDENDUM:  Echocardiogram demonstrates preserved left ventricular systolic function with an EF of 65 to 70%.  Also seen however is mild concentric left ventricular hypertrophy with marked proximal basal septal thickening.  He does have a very thick aberrant cord to the basal septum which makes assessment of basal septal thickness challenging.  Regardless, there is definite basal septal hypertrophy. There is some mild left ventricular outflow tract obstruction.     I discussed the results of the echocardiogram with him.  My suspicion is that this is all due to his significant hypertension which was previously not well  controlled.  However, I did discuss that given the basal septal thickness, hypertrophic cardiomyopathy is a possibility.  I recommended that when he seen back for follow-up consideration of outpatient cardiac MRI could be pursued to look for evidence of myocardial delayed enhancement and reassessment of basal septal thickness.    Additionally, his blood pressures continue to be elevated.  I am going to increase his carvedilol to 18.75 mg twice a day tonight.  He will continue on lisinopril 20 mg twice a day as well.    Interval History   Doing well this morning.  No further chest pain.  No shortness of breath or dyspnea on exertion.    Physical Exam   Temp: 98.2  F (36.8  C) Temp src: Oral BP: (!) 157/110 Pulse: 65 Heart Rate: 103 Resp: 14 SpO2: 97 % O2 Device: None (Room air)    Vitals:    05/31/19 1718 06/01/19 0424   Weight: 99.8 kg (220 lb) 102.7 kg (226 lb 8 oz)     Vital Signs with Ranges  Temp:  [97.8  F (36.6  C)-98.2  F (36.8  C)] 98.2  F (36.8  C)  Pulse:  [57-96] 65  Heart Rate:  [] 103  Resp:  [0-20] 14  BP: (127-171)/() 157/110  SpO2:  [95 %-99 %] 97 %  I/O last 3 completed shifts:  In: -   Out: 1900 [Urine:1900]    Constitutional: No apparent distress.   Eyes: No xanthelasma or conjunctivitis  Respiratory: Clear to auscultation bilaterally. No crackles or wheezes.  Cardiovascular: Regular rate and rhythm. Normal S1 and S2. No murmurs. No extra heart sounds. No JVD.   Extremities: No peripheral edema.  Neurologic: Moving all extremities. No facial assymmetry.  Psychiatric: Alert and oriented. Answers questions appropriately.     Medications     Percutaneous Coronary Intervention orders placed (this is information for BPA alerting)         aspirin  81 mg Oral Daily     carvedilol  12.5 mg Oral BID     escitalopram  20 mg Oral Daily     lisinopril  10 mg Oral BID     perflutren diluted 1mL to 2mL with saline  9 mL Intravenous Once     rosuvastatin  40 mg Oral Daily     sodium chloride (PF)  10  mL Intravenous Once     ticagrelor  90 mg Oral Q12H       Data   Results for orders placed or performed during the hospital encounter of 05/31/19 (from the past 24 hour(s))   EKG 12-lead, tracing only   Result Value Ref Range    Interpretation ECG Click View Image link to view waveform and result    CBC with platelets differential   Result Value Ref Range    WBC 6.4 4.0 - 11.0 10e9/L    RBC Count 4.84 4.4 - 5.9 10e12/L    Hemoglobin 14.7 13.3 - 17.7 g/dL    Hematocrit 41.3 40.0 - 53.0 %    MCV 85 78 - 100 fl    MCH 30.4 26.5 - 33.0 pg    MCHC 35.6 31.5 - 36.5 g/dL    RDW 12.1 10.0 - 15.0 %    Platelet Count 254 150 - 450 10e9/L    Diff Method Automated Method     % Neutrophils 54.7 %    % Lymphocytes 33.8 %    % Monocytes 8.3 %    % Eosinophils 2.3 %    % Basophils 0.6 %    % Immature Granulocytes 0.3 %    Nucleated RBCs 0 0 /100    Absolute Neutrophil 3.5 1.6 - 8.3 10e9/L    Absolute Lymphocytes 2.2 0.8 - 5.3 10e9/L    Absolute Monocytes 0.5 0.0 - 1.3 10e9/L    Absolute Eosinophils 0.2 0.0 - 0.7 10e9/L    Absolute Basophils 0.0 0.0 - 0.2 10e9/L    Abs Immature Granulocytes 0.0 0 - 0.4 10e9/L    Absolute Nucleated RBC 0.0    Troponin I   Result Value Ref Range    Troponin I ES 0.064 (H) 0.000 - 0.045 ug/L   Comprehensive metabolic panel   Result Value Ref Range    Sodium 140 133 - 144 mmol/L    Potassium 4.0 3.4 - 5.3 mmol/L    Chloride 107 94 - 109 mmol/L    Carbon Dioxide 30 20 - 32 mmol/L    Anion Gap 3 3 - 14 mmol/L    Glucose 86 70 - 99 mg/dL    Urea Nitrogen 15 7 - 30 mg/dL    Creatinine 0.94 0.66 - 1.25 mg/dL    GFR Estimate >90 >60 mL/min/[1.73_m2]    GFR Estimate If Black >90 >60 mL/min/[1.73_m2]    Calcium 8.6 8.5 - 10.1 mg/dL    Bilirubin Total 0.8 0.2 - 1.3 mg/dL    Albumin 4.2 3.4 - 5.0 g/dL    Protein Total 8.0 6.8 - 8.8 g/dL    Alkaline Phosphatase 89 40 - 150 U/L    ALT 61 0 - 70 U/L    AST 33 0 - 45 U/L   Lipid Profile   Result Value Ref Range    Cholesterol 219 (H) <200 mg/dL    Triglycerides 203  (H) <150 mg/dL    HDL Cholesterol 37 (L) >39 mg/dL    LDL Cholesterol Calculated 141 (H) <100 mg/dL    Non HDL Cholesterol 182 (H) <130 mg/dL   EKG 12-lead, tracing only   Result Value Ref Range    Interpretation ECG Click View Image link to view waveform and result    Chest  XR, 1 view PORTABLE    Narrative    CHEST SINGLE VIEW PORTABLE   5/31/2019 5:51 PM     HISTORY: Chest pain.    COMPARISON: None.    FINDINGS: The lungs are clear. Normal-sized cardiac silhouette.      Impression    IMPRESSION: No evidence of active cardiopulmonary disease.    SURAJ HUGHES MD   Activated clotting time POCT   Result Value Ref Range    Activated Clot Time 176 (H) 75 - 150 sec   Activated clotting time POCT   Result Value Ref Range    Activated Clot Time 241 (H) 75 - 150 sec   Activated clotting time POCT   Result Value Ref Range    Activated Clot Time 221 (H) 75 - 150 sec   Cardiac Catheterization   Result Value Ref Range    Cath EF Quantitative 65 %    Narrative      Left ventricular filling pressures are normal .    The ejection fraction is calculated to be 65%.    Mid RCA lesion is 90% stenosed.    2nd Mrg lesion is 30% stenosed.    Ost 2nd Mrg lesion is 30% stenosed.    Mid LAD lesion is 50% stenosed.     1.  Accessible angioplasty and stenting of a 90% thrombotic mid RCA   stenosis.  Right coronary artery is a huge dominant vessel.  Successfully   stented with a 4 oh by 32 mm Synergy drug-eluting stent leaving a 0%   residual stenosis with CESAR grade III flow.  An acute marginal branch was   occluded.  2.  Sequential 50% mid LAD stenoses.  3.  Hyperdynamic left ventricular function ejection fraction of 60 to 65%.    Left ventricular end-diastolic pressure of 9 mmHg.  No mitral   regurgitation.     EKG 12-lead, tracing only    Result Value Ref Range    Interpretation ECG Click View Image link to view waveform and result    Troponin I   Result Value Ref Range    Troponin I ES 0.774 (HH) 0.000 - 0.045 ug/L   Troponin I    Result Value Ref Range    Troponin I ES 1.758 (HH) 0.000 - 0.045 ug/L   Basic metabolic panel   Result Value Ref Range    Sodium 140 133 - 144 mmol/L    Potassium 3.7 3.4 - 5.3 mmol/L    Chloride 109 94 - 109 mmol/L    Carbon Dioxide 26 20 - 32 mmol/L    Anion Gap 5 3 - 14 mmol/L    Glucose 91 70 - 99 mg/dL    Urea Nitrogen 12 7 - 30 mg/dL    Creatinine 0.82 0.66 - 1.25 mg/dL    GFR Estimate >90 >60 mL/min/[1.73_m2]    GFR Estimate If Black >90 >60 mL/min/[1.73_m2]    Calcium 8.4 (L) 8.5 - 10.1 mg/dL   CBC with platelets   Result Value Ref Range    WBC 7.0 4.0 - 11.0 10e9/L    RBC Count 4.64 4.4 - 5.9 10e12/L    Hemoglobin 14.0 13.3 - 17.7 g/dL    Hematocrit 39.3 (L) 40.0 - 53.0 %    MCV 85 78 - 100 fl    MCH 30.2 26.5 - 33.0 pg    MCHC 35.6 31.5 - 36.5 g/dL    RDW 12.1 10.0 - 15.0 %    Platelet Count 231 150 - 450 10e9/L   Troponin I   Result Value Ref Range    Troponin I ES 2.657 (HH) 0.000 - 0.045 ug/L

## 2019-06-01 NOTE — PROGRESS NOTES
06/01/19 0959   Quick Adds   Type of Visit Initial Occupational Therapy Evaluation   Living Environment   Lives With spouse;child(qamar), dependent   Living Arrangements house   Transportation Anticipated car, drives self;family or friend will provide   Living Environment Comment 4 steps into home, 2 flights within home.    Self-Care   Usual Activity Tolerance good   Current Activity Tolerance good   Regular Exercise Yes   Activity/Exercise Type other (see comments)  (elliptical)   Exercise Amount/Frequency 3-5 times/wk;1 hr   Equipment Currently Used at Home none   Functional Level   Ambulation 0-->independent   Transferring 0-->independent   Toileting 0-->independent   Bathing 0-->independent   Dressing 0-->independent   Eating 0-->independent   Communication 0-->understands/communicates without difficulty   Swallowing 0-->swallows foods/liquids without difficulty   Cognition 0 - no cognition issues reported   Fall history within last six months no   Which of the above functional risks had a recent onset or change? none   Prior Functional Level Comment pt works as , not on his feet.    General Information   Onset of Illness/Injury or Date of Surgery - Date 05/31/19   Referring Physician Baltazar Gómez MD   Patient/Family Goals Statement return home   Additional Occupational Profile Info/Pertinent History of Current Problem STEMI with PCI to RCA   Precautions/Limitations no known precautions/limitations   Cognitive Status Examination   Orientation orientation to person, place and time   Level of Consciousness alert   Follows Commands (Cognition) WNL   Memory intact   Attention No deficits were identified   Visual Perception   Visual Perception Wears glasses   Pain Assessment   Patient Currently in Pain No   Balance   Balance Comments IND in room   Instrumental Activities of Daily Living (IADL)   Previous Responsibilities meal prep;housekeeping;laundry;shopping;yardwork;medication  "management;finances;driving;work   Activities of Daily Living Analysis   Impairments Contributing to Impaired Activities of Daily Living   (need for monitored exercise)   General Therapy Interventions   Planned Therapy Interventions home program guidelines;risk factor education;progressive activity/exercise   Clinical Impression   Criteria for Skilled Therapeutic Interventions Met yes, treatment indicated   OT Diagnosis need for monitored exercise   Influenced by the following impairments dec ax tolerance   Assessment of Occupational Performance 1-3 Performance Deficits   Identified Performance Deficits exercise   Clinical Decision Making (Complexity) Low complexity   Therapy Frequency 2 times/day   Predicted Duration of Therapy Intervention (days/wks) 2 days   Anticipated Discharge Disposition Home with Outpatient Therapy   Risks and Benefits of Treatment have been explained. Yes   Patient, Family & other staff in agreement with plan of care Yes   [facility] [employer] \"6 Clicks\"   2016, Trustees of [facility], under license to [employer].  All rights reserved.   6 Clicks Short Forms Daily Activity Inpatient Short Form   [facility]  \"6 Clicks\" Daily Activity Inpatient Short Form   1. Putting on and taking off regular lower body clothing? 4 - None   2. Bathing (including washing, rinsing, drying)? 4 - None   3. Toileting, which includes using toilet, bedpan or urinal? 4 - None   4. Putting on and taking off regular upper body clothing? 4 - None   5. Taking care of personal grooming such as brushing teeth? 4 - None   6. Eating meals? 4 - None   Daily Activity Raw Score (Score out of 24.Lower scores equate to lower levels of function) 24   Total Evaluation Time   Total Evaluation Time (Minutes) 8     "

## 2019-06-01 NOTE — PHARMACY-ADMISSION MEDICATION HISTORY
Admission medication history interview status for the 5/31/2019  admission is complete. See EPIC admission navigator for prior to admission medications     Medication history source reliability:Good    Actions taken by pharmacist (provider contacted, etc):None     Additional medication history information not noted on PTA med list :None    Medication reconciliation/reorder completed by provider prior to medication history? Yes    Time spent in this activity: 5min    Prior to Admission medications    Medication Sig Last Dose Taking? Auth Provider   escitalopram (LEXAPRO) 20 MG tablet Take 1 tablet (20 mg) by mouth daily 5/31/2019 at Unknown time Yes Hamilton Edge MD

## 2019-06-01 NOTE — PLAN OF CARE
Full Code. A&O x4. VSS. SR 1d AVB EKG recorded 80s. Sites L radial stable, unchanged. Lung sounds clear. RA. Denies shortness of breath. SBA. Denies pain. Arrived to CCU after emergent stenting RAY x1 RCA at 1930. L TR off 0130. Site stable. Continue to monitor.

## 2019-06-01 NOTE — PLAN OF CARE
A&Ox4. VSS on room air. SR w/ 1 degree AVB. Denies CP/SOB/pain. Up independently, ambulation encouraged. L radial site CDI, CMS intact. SBP slightly elevated 130-150's, Lisinopril and coreg doses both increased today. Education r/t newly prescribe cardiac medications done, handouts given to patient and his wife. Verbal order from Dr. Frey, pt may transfer [place] [facility] [place] is available. Plan is to discharge tomorrow.     Addendum:   Pt complained of small headache this afternoon, Tylenol given.

## 2019-06-02 ENCOUNTER — APPOINTMENT (OUTPATIENT)
Dept: OCCUPATIONAL THERAPY | Facility: CLINIC | Age: 44
End: 2019-06-02
Payer: COMMERCIAL

## 2019-06-02 VITALS
WEIGHT: 221.6 LBS | HEART RATE: 65 BPM | SYSTOLIC BLOOD PRESSURE: 138 MMHG | HEIGHT: 71 IN | TEMPERATURE: 98 F | DIASTOLIC BLOOD PRESSURE: 103 MMHG | OXYGEN SATURATION: 95 % | RESPIRATION RATE: 16 BRPM | BODY MASS INDEX: 31.02 KG/M2

## 2019-06-02 LAB
ANION GAP SERPL CALCULATED.3IONS-SCNC: 6 MMOL/L (ref 3–14)
BUN SERPL-MCNC: 14 MG/DL (ref 7–30)
CALCIUM SERPL-MCNC: 8.9 MG/DL (ref 8.5–10.1)
CHLORIDE SERPL-SCNC: 106 MMOL/L (ref 94–109)
CO2 SERPL-SCNC: 26 MMOL/L (ref 20–32)
CREAT SERPL-MCNC: 0.92 MG/DL (ref 0.66–1.25)
GFR SERPL CREATININE-BSD FRML MDRD: >90 ML/MIN/{1.73_M2}
GLUCOSE SERPL-MCNC: 143 MG/DL (ref 70–99)
POTASSIUM SERPL-SCNC: 4.1 MMOL/L (ref 3.4–5.3)
SODIUM SERPL-SCNC: 138 MMOL/L (ref 133–144)

## 2019-06-02 PROCEDURE — 25000132 ZZH RX MED GY IP 250 OP 250 PS 637: Performed by: INTERNAL MEDICINE

## 2019-06-02 PROCEDURE — 99238 HOSP IP/OBS DSCHRG MGMT 30/<: CPT | Performed by: INTERNAL MEDICINE

## 2019-06-02 PROCEDURE — 36415 COLL VENOUS BLD VENIPUNCTURE: CPT | Performed by: INTERNAL MEDICINE

## 2019-06-02 PROCEDURE — 80048 BASIC METABOLIC PNL TOTAL CA: CPT | Performed by: INTERNAL MEDICINE

## 2019-06-02 PROCEDURE — 99232 SBSQ HOSP IP/OBS MODERATE 35: CPT | Performed by: INTERNAL MEDICINE

## 2019-06-02 PROCEDURE — 97110 THERAPEUTIC EXERCISES: CPT | Mod: GO

## 2019-06-02 RX ORDER — NITROGLYCERIN 0.4 MG/1
TABLET SUBLINGUAL
Qty: 25 TABLET | Refills: 0 | Status: SHIPPED | OUTPATIENT
Start: 2019-06-02

## 2019-06-02 RX ORDER — LISINOPRIL 20 MG/1
20 TABLET ORAL
Qty: 60 TABLET | Refills: 0 | Status: SHIPPED | OUTPATIENT
Start: 2019-06-02 | End: 2019-06-06

## 2019-06-02 RX ORDER — ROSUVASTATIN CALCIUM 40 MG/1
40 TABLET, COATED ORAL DAILY
Qty: 30 TABLET | Refills: 0 | Status: SHIPPED | OUTPATIENT
Start: 2019-06-02 | End: 2019-06-06

## 2019-06-02 RX ORDER — CARVEDILOL 12.5 MG/1
18.75 TABLET ORAL 2 TIMES DAILY
Qty: 90 TABLET | Refills: 0 | Status: SHIPPED | OUTPATIENT
Start: 2019-06-02 | End: 2019-06-06

## 2019-06-02 RX ADMIN — ESCITALOPRAM OXALATE 20 MG: 10 TABLET ORAL at 09:37

## 2019-06-02 RX ADMIN — LISINOPRIL 20 MG: 20 TABLET ORAL at 09:36

## 2019-06-02 RX ADMIN — CARVEDILOL 18.75 MG: 6.25 TABLET, FILM COATED ORAL at 09:37

## 2019-06-02 RX ADMIN — TICAGRELOR 90 MG: 90 TABLET ORAL at 06:22

## 2019-06-02 RX ADMIN — ROSUVASTATIN CALCIUM 40 MG: 20 TABLET, FILM COATED ORAL at 09:37

## 2019-06-02 RX ADMIN — ASPIRIN 81 MG: 81 TABLET, COATED ORAL at 09:37

## 2019-06-02 ASSESSMENT — ACTIVITIES OF DAILY LIVING (ADL)
ADLS_ACUITY_SCORE: 10

## 2019-06-02 ASSESSMENT — MIFFLIN-ST. JEOR: SCORE: 1932.3

## 2019-06-02 NOTE — DISCHARGE SUMMARY
[facility]  Hospitalist Discharge Summary       Date of Admission:  5/31/2019  Date of Discharge:  6/2/2019  Discharging Provider: Lisa Perrin MD      Discharge Diagnoses   Acute inferior STEMI    S/p PCI was performed RAY to the mid RCA. Hypertension   HTN  Hyperlipidemia   LVH with basal septal hypertrophy    Follow-ups Needed After Discharge   Follow-up Appointments     Follow-up and recommended labs and tests       Follow up with primary care provider, Hamilton Edge, within 7 days to   evaluate medication change, and for hospital follow- up.  The following   labs/tests are recommended: BMP.  Follow up with cardiology in 2 weeks per their recommendation.  Attend Cardiac Rehab.             Unresulted Labs Ordered in the Past 30 Days of this Admission     Date and Time Order Name Status Description    6/1/2019 0002 Lipoprotein A In process       These results will be followed up by cardiology.     Discharge Disposition   Discharged to home  Condition at discharge: Good    Hospital Course      Mr. Chacorta Willis is a 43-year-old  gentleman with a past medical history notable for depression/anxiety, who presented with an acute onset of precordial chest pain with the findings of inferior ST elevation myocardial infarction.  He was taken urgently to the Cath Lab and underwent PCI with stent to RCA.  He is admitted to the Hospitalist Service.     Acute inferior STEMI  The patient presented with acute onset of precordial chest pain and ST elevation in inferior leads on EKG. Taken urgently to the Cath Lab.  Angiogram revealed 90% stenosis of mid RCA, 30% stenosis of second obtuse marginal artery and 50% mid-LAD lesion.  LV function was hyperdynamic with EF of 60% to 65%.    S/p PCI was performed RAY to the mid RCA. Hypertension   HTN- BP on 06/01/19, reasonably controlled 134/94.   Hyperlipidemia (, HDL 37, )   LVH with basal septal hypertrophy- Likely secondary to HTN, hypertrophic  cardiomyopathy unlikely. Echocardiogram shows EF of 65-70% with mild concentric LVH with marked proximal thickening with findings consistent with LV outflow obstruction, trivial mean gradient of 6 mm Hg.   - Started on ASA 81 mg p.o. daily, Brilinta 90 mg p.o. b.i.d., lisinopril 20 mg p.o. BID, Carvedilol 18.7.5 mg p.o. b.i.d., and Crestor 40 mg qd during this stay.  - Cardiac MRI to evaluate septal hypertrophy per cardiology team at follow up.     - Cardiac rehab referral placed.     Depression/anxiety.  Stable.     - Continue PTA Lexapro 20 mg p.o. daily.       Consultations This Hospital Stay   PHARMACY TO DOSE HEPARIN  NUTRITION SERVICES ADULT IP CONSULT  CARDIAC REHAB IP CONSULT  PHARMACY IP CONSULT  PHARMACY IP CONSULT  SMOKING CESSATION PROGRAM IP CONSULT    Code Status   Full Code    Time Spent on this Encounter   ILisa, personally saw the patient today and spent less than or equal to 30 minutes discharging this patient.       Lisa Perrin MD  [facility]  ______________________________________________________________________    Physical Exam   Vital Signs: Temp: 98  F (36.7  C) Temp src: Oral BP: (!) 149/102   Heart Rate: 73 Resp: 16 SpO2: 95 % O2 Device: None (Room air)    Weight: 221 lbs 9.6 oz  Constitutional: NAD,   Neuropsyche:  alert and oriented, answers questions appropriately.   Respiratory:  Breathing comfortably, good air exchange, no wheezes, no crackles.   Cardiovascular:  Regular rate and rhythm, no edema.  GI:  soft, NT/ND, BS normal  Skin/Integumen:  No acute rash, bruising or sign of bleeding.          Primary Care Physician   Hamilton Edge    Discharge Orders      CARDIAC REHAB REFERRAL      Follow-Up with Cardiologist      EKG 12-lead complete w/read  (to be scheduled)     Reason for your hospital stay    You were admitted with a heart attack.     Follow-up and recommended labs and tests     Follow up with primary care provider, Hamilton Edge, within 7 days  to evaluate medication change, and for hospital follow- up.  The following labs/tests are recommended: BMP.  Follow up with cardiology in 2 weeks per their recommendation.  Attend Cardiac Rehab.     Activity    Your activity upon discharge: activity as tolerated, increase gradually under the supervision of cardiac rehabilitation.     Diet    Follow this diet upon discharge: A Mediterranean Diet is recommended.       Significant Results and Procedures   Most Recent 3 CBC's:  Recent Labs   Lab Test 06/01/19  0526 05/31/19  1735 01/13/15  0857   WBC 7.0 6.4 4.9   HGB 14.0 14.7 13.7   MCV 85 85 88    254 267     Most Recent 3 BMP's:  Recent Labs   Lab Test 06/02/19  0828 06/01/19  0526 05/31/19  1735    140 140   POTASSIUM 4.1 3.7 4.0   CHLORIDE 106 109 107   CO2 26 26 30   BUN 14 12 15   CR 0.92 0.82 0.94   ANIONGAP 6 5 3   NICOLE 8.9 8.4* 8.6   * 91 86     Most Recent 2 LFT's:  Recent Labs   Lab Test 05/31/19  1735 01/13/15  0857   AST 33 18   ALT 61 30   ALKPHOS 89 87   BILITOTAL 0.8 0.5     Most Recent 3 Troponin's:  Recent Labs   Lab Test 06/01/19  1447 06/01/19  0526 06/01/19  0009   TROPI 2.478* 2.657* 1.758*     Most Recent Cholesterol Panel:  Recent Labs   Lab Test 05/31/19  1735   CHOL 219*   *   HDL 37*   TRIG 203*     Most Recent TSH and T4:No lab results found.,   Results for orders placed or performed during the hospital encounter of 05/31/19   Chest  XR, 1 view PORTABLE    Narrative    CHEST SINGLE VIEW PORTABLE   5/31/2019 5:51 PM     HISTORY: Chest pain.    COMPARISON: None.    FINDINGS: The lungs are clear. Normal-sized cardiac silhouette.      Impression    IMPRESSION: No evidence of active cardiopulmonary disease.    SURAJ HUGHES MD       Discharge Medications   Current Discharge Medication List      START taking these medications    Details   aspirin (ASA) 81 MG EC tablet Take 1 tablet (81 mg) by mouth daily    Associated Diagnoses: STEMI involving right coronary artery  (H)      carvedilol (COREG) 12.5 MG tablet Take 1.5 tablets (18.75 mg) by mouth 2 times daily  Qty: 90 tablet, Refills: 0    Comments: Future refills by PCP Dr. Hamilton Edge with phone number [phone].  Associated Diagnoses: STEMI involving right coronary artery (H)      lisinopril (PRINIVIL/ZESTRIL) 20 MG tablet Take 1 tablet (20 mg) by mouth 2 times daily  Qty: 60 tablet, Refills: 0    Comments: Future refills by PCP Dr. Hamilton Edge with phone number [phone].  Associated Diagnoses: STEMI involving right coronary artery (H)      nitroGLYcerin (NITROSTAT) 0.4 MG sublingual tablet For chest pain place 1 tablet under the tongue every 5 minutes for 3 doses. If symptoms persist 5 minutes after 1st dose call 911.  Qty: 25 tablet, Refills: 0    Comments: Future refills by PCP Dr. Hamilton Edge with phone number [phone].  Associated Diagnoses: STEMI involving right coronary artery (H)      rosuvastatin (CRESTOR) 40 MG tablet Take 1 tablet (40 mg) by mouth daily  Qty: 30 tablet, Refills: 0    Comments: Future refills by PCP Dr. Hamilton Edge with phone number [phone].  Associated Diagnoses: STEMI involving right coronary artery (H)      ticagrelor (BRILINTA) 90 MG tablet Take 1 tablet (90 mg) by mouth every 12 hours  Qty: 60 tablet, Refills: 0    Comments: Future refills by PCP Dr. Hamilton Edge with phone number [phone].  Associated Diagnoses: STEMI involving right coronary artery (H)         CONTINUE these medications which have NOT CHANGED    Details   escitalopram (LEXAPRO) 20 MG tablet Take 1 tablet (20 mg) by mouth daily    Comments: Prescribed by Psych           Allergies   No Known Allergies

## 2019-06-02 NOTE — PROGRESS NOTES
[facility]    Cardiology Progress Note     Assessment & Plan   Nathan Willis is a 43 year old male who was admitted on 5/31/2019.  Cardiology was consulted for recommendations regarding acute STEMI.     1.  Acute inferior STEMI status post PCI of the RCA  2.  Moderate residual mid LAD stenosis and mild stenosis of the second obtuse marginal, plan for medical management  3.  Hypertension  4.  Hyperlipidemia  5.  Left ventricular hypertrophy with basal septal hypertrophy, likely due to uncontrolled hypertension, less likely hypertrophic cardiomyopathy    Continues to do well.  No arrhythmia events have been seen concerning for reperfusion ventricular tachycardia.  He is now 48 hours after his STEMI and therefore I feel he can be safely dismissed from the hospital.    His blood pressures are under better control with the increased doses of carvedilol and lisinopril.  Would recommend dismissal on current doses of carvedilol 18.75 mg twice a day and lisinopril 20 mg twice a day.  If additional antihypertensive support is necessary then would favor adding a thiazide-based diuretic such as hydrochlorothiazide 25 mg daily.  He will continue on aspirin and Brilinta for his stent and rosuvastatin for his coronary artery disease.    We will plan for follow-up in 2 weeks to ensure he is continuing to do well here at [facility].  I am not going to prescheduled the cardiac MRI for his basal septal hypertrophy and left ventricular hypertrophy but will defer that to the team who sees him in the outpatient setting.  He knows to call with any questions or concerns regarding his recent catheterization and MI.    Jairo Frey MD    Interval History   Doing very well this morning.  No chest pain or chest discomfort.  No shortness of breath or dyspnea on exertion.    Physical Exam   Temp: 98  F (36.7  C) Temp src: Oral BP: (!) 139/98   Heart Rate: 73 Resp: 16 SpO2: 95 % O2 Device: None (Room air)    Vitals:     19 1718 19 0424 19 0614   Weight: 99.8 kg (220 lb) 102.7 kg (226 lb 8 oz) 100.5 kg (221 lb 9.6 oz)     Vital Signs with Ranges  Temp:  [97.5  F (36.4  C)-98  F (36.7  C)] 98  F (36.7  C)  Heart Rate:  [] 73  Resp:  [14-18] 16  BP: (131-157)/() 139/98  SpO2:  [95 %-98 %] 95 %  I/O last 3 completed shifts:  In: 300 [P.O.:300]  Out: 510 [Urine:510]    Constitutional: No apparent distress.   Eyes: No xanthelasma or conjunctivitis  Respiratory: Clear to auscultation bilaterally. No crackles or wheezes.  Cardiovascular: Regular rate and rhythm. Normal S1 and S2. No murmurs. No extra heart sounds. No JVD.   Extremities: No peripheral edema.  Neurologic: Moving all extremities. No facial assymmetry.  Psychiatric: Alert and oriented. Answers questions appropriately.     Medications     Percutaneous Coronary Intervention orders placed (this is information for BPA alerting)         aspirin  81 mg Oral Daily     carvedilol  18.75 mg Oral BID     escitalopram  20 mg Oral Daily     lisinopril  20 mg Oral BID     rosuvastatin  40 mg Oral Daily     ticagrelor  90 mg Oral Q12H       Data   Results for orders placed or performed during the hospital encounter of 19 (from the past 24 hour(s))   Echocardiogram Complete    Narrative    [id number]  [id number]  [id number]  [id number]^LIZA^JAI^TRICIA           [facility]  Echocardiography Laboratory  [address] [place] [address]        Name: VIRGINIA RIBEIRO  MRN: [id number]  : 1975  Study Date: 2019 10:40 AM  Age: 43 yrs  Gender: Male  Patient Location: [facility]  Reason For Study: CAD  Ordering Physician: JAI DE JESUS  Referring Physician: Hamilton Edge  Performed By: Mali Gibbons     BSA: 2.3 m2  Height: 68 in  Weight: 257 lb  HR: 70  BP: 157/110 mmHg  _____________________________________________________________________________  __        Procedure  Complete Portable Echo Adult. Contrast  Optison.  _____________________________________________________________________________  __        Interpretation Summary     Left ventricular systolic function is normal. The visual ejection fraction is  estimated at 65-70%.  There is mild concentric left ventricular hypertrophy with marked proximal  septal thickening noted.  The echo findings are consistent with left ventricular outflow obstruction.  Thee is a trivial mean gradient of 6 mmHg.  Contrast was used without apparent complications. There is no comparison study  available.  _____________________________________________________________________________  __        Left Ventricle  The left ventricular cavity is small. There is mild concentric left  ventricular hypertrophy. Proximal septal thickening is noted. The echo  findings are consistent with left ventricular outflow obstruction. Thee is a  trivial mean gradient of 6 mmHg. Left ventricular systolic function is normal.  The visual ejection fraction is estimated at 65-70%. Diastolic Doppler  findings (E/E' ratio and/or other parameters) suggest left ventricular filling  pressures are indeterminate. No regional wall motion abnormalities noted.     Right Ventricle  The right ventricle is normal size. TAPSE 1.7 (borderline normal). The right  ventricle is not well visualized.     Atria  Normal left atrial size. Right atrial size is normal. There is no atrial shunt  seen.     Mitral Valve  No systolic anterior motion of the mitral valve. There is trace mitral  regurgitation.        Tricuspid Valve  There is trace tricuspid regurgitation. The right ventricular systolic  pressure is approximated at 17.2 mmHg plus the right atrial pressure.     Aortic Valve  The aortic valve is trileaflet. No aortic regurgitation is present. No  hemodynamically significant valvular aortic stenosis.     Pulmonic Valve  There is no pulmonic valvular stenosis.     Vessels  Normal size aorta. IVC 1.9 cm with reduced respiratory  variation.     Pericardium  The pericardium appears normal.        Rhythm  Sinus rhythm was noted.  _____________________________________________________________________________  __  MMode/2D Measurements & Calculations  IVSd: 1.6 cm     LVIDd: 4.0 cm  LVIDs: 2.5 cm  LVPWd: 1.2 cm  FS: 37.1 %  LV mass(C)d: 217.0 grams  LV mass(C)dI: 95.5 grams/m2  Ao root diam: 3.5 cm  LA dimension: 4.2 cm  asc Aorta Diam: 3.6 cm  LA/Ao: 1.2  LVOT diam: 2.2 cm  LVOT area: 3.9 cm2  LA Volume (BP): 50.8 ml  LA Volume Index (BP): 22.9 ml/m2  RWT: 0.59           Doppler Measurements & Calculations  MV E max thai: 64.3 cm/sec  MV A max thai: 72.0 cm/sec  MV E/A: 0.89  MV dec slope: 307.9 cm/sec2  Ao V2 max: 187.6 cm/sec  Ao max P.0 mmHg  Ao V2 mean: 129.1 cm/sec  Ao mean P.7 mmHg  Ao V2 VTI: 38.2 cm  TIARA(I,D): 3.7 cm2  TIARA(V,D): 3.5 cm2  LV V1 max P.4 mmHg  LV V1 max: 168.5 cm/sec  LV V1 VTI: 36.5 cm  SV(LVOT): 142.4 ml  SI(LVOT): 62.6 ml/m2  PI end-d thai: 105.9 cm/sec  TR max thai: 207.5 cm/sec  TR max P.2 mmHg  AV Thai Ratio (DI): 0.90  TIARA Index (cm2/m2): 1.6  E/E' av.1  Lateral E/e': 6.6  Medial E/e': 9.5              _____________________________________________________________________________  __        Report approved by: Laura Veloz 2019 03:52 PM      Troponin I   Result Value Ref Range    Troponin I ES 2.478 (HH) 0.000 - 0.045 ug/L

## 2019-06-02 NOTE — DISCHARGE INSTRUCTIONS
Cardiac Angioplasty/Stent Discharge Instructions - Radial    After you go home:      Have an adult stay with you until tomorrow.    Drink extra fluids for 2 days.    You may resume your normal diet.    No smoking       For 24 hours - due to the sedation you received:    Relax and take it easy.    Do NOT make any important or legal decisions.    Do NOT drive or operate machines at home or at work.    Do NOT drink alcohol.    Care of Wrist Puncture Site:      For the first 24 hrs - check the puncture site every 1-2 hours while awake.    It is normal to have soreness at the puncture site and mild tingling in your hand for up to 3 days.    Remove the bandaid after 24 hours. If there is minor oozing, apply another bandaid and remove it after 12 hours.    You may shower tomorrow.  Do NOT take a bath, or use a hot tub or pool for at least 3 days. Do NOT scrub the site. Do not use lotion or powder near the puncture site.           Activity:          For 2 days:     do not use your hand or arm to support your weight (such as rising from a chair)     do not bend your wrist (such as lifting a garage door).    do not lift more than 5 pounds or exercise your arm (such as tennis, golf or bowling).    Do NOT do any heavy activity such as exercise, lifting, or straining.     Bleeding:      If you start bleeding from the site in your wrist, sit down and press firmly on/above the site for 10 minutes.     Once bleeding stops, keep arm still for 2 hours.     Call [facility] as soon as you can.       Call 911 right away if you have heavy bleeding or bleeding that does not stop.      Medicines:      If you are taking an antiplatelet medication such as Plavix, Brilinta or Effient, do not stop taking it until you talk to your cardiologist.        If you are on Metformin (Glucophage), do not restart it until you have blood tests (within 2 to 3 days after discharge).  After you have your blood drawn, you may restart the Metformin.     Take  your medications, including blood thinners, unless your provider tells you not to.  If you take Coumadin (Warfarin), have your INR checked by your provider in  3-5 days. Call your clinic to schedule this.    If you have stopped any medicines, check with your provider about when to restart them.    Follow Up Appointments:      Follow up with [facility] Nurse Practitioner at [facility] of patient preference in 7-10 days.    Cardiac Rehab will contact you for follow up care.    Call the clinic if:      You have a large or growing hard lump around the site.    The site is red, swollen, hot or tender.    Blood or fluid is draining from the site.    You have chills or a fever greater than 101 F (38 C).    Your arm feels numb, cool or changes color.    You have hives, a rash or unusual itching.    Any questions or concerns.    Other Instructions:      If you received a stent - carry your stent card with you at all times.      [facility] at [place]:    [phone] [facility] (7 days a week)          [facility] will contact you this week to help you schedule an appointment with a Nurse Practitioner/Physicians Assistant.     [facility]  [address]  [place] [address]  Phone: [phone]

## 2019-06-02 NOTE — PLAN OF CARE
A/ox4.denies any cp and sob. VSS on RA. Tele- SR/1'avb. Left radial site cms intact.Up independently in the room.Possible discharge today.will continue to monitor.

## 2019-06-02 NOTE — PLAN OF CARE
Discharge Planner OT   Patient plan for discharge: home     Current status:  Pt ambulated on treadmill x 20 minutes at 2.5-2.8 MPH, tolerates well. Hypertensive BP at rest, 149/102. BP following exercise 148/101.     Barriers to return to prior living situation: none     Recommendations for discharge: home [facility]    Rationale for recommendations: Pt will benefit from OP CR for progressive monitored physical activity for overall cardiac health. Pt has met IP CR goals. discharge IP CR.          Entered by: Keely Maynard 06/02/2019 9:20 AM     Occupational Therapy Discharge Summary    Reason for therapy discharge:    All goals and outcomes met, no further needs identified.    Progress towards therapy goal(s). See goals on Care Plan in [facility] electronic health record for goal details.  Goals met    Therapy recommendation(s):    Continued therapy is recommended.  Rationale/Recommendations:  [facility].

## 2019-06-02 NOTE — PROGRESS NOTES
Care Coordination:    -Brilinta sent to [facility]. Spoke with pharmacy who states the pt has a co-pay of $24.99/mo.  [facility] states they have the medication in stock. Pt states he has an bandar on his phone with the discount card on it and [facility] will use it.     Nakia Bunch RN, [facility]   Care Coordinator  Ph. [phone]

## 2019-06-02 NOTE — PLAN OF CARE
VSS. Tele shows sr with 1 deg avb. Pt denies any CP or SOB. Discharge instructions, medication indications/side effects, and follow up instructions discussed w/ pt. Will receive call from cards for follow-ups. Handouts given. All questions answered. All pt belongings are accounted for and sent w/ pt. Pt left floor via ambulation and was driven home by wife.

## 2019-06-03 LAB — LPA SERPL-MCNC: 15 MG/DL (ref 0–30)

## 2019-06-04 ENCOUNTER — OFFICE VISIT (OUTPATIENT)
Dept: CARDIOLOGY | Facility: CLINIC | Age: 44
End: 2019-06-04
Attending: INTERNAL MEDICINE
Payer: COMMERCIAL

## 2019-06-04 VITALS
SYSTOLIC BLOOD PRESSURE: 134 MMHG | BODY MASS INDEX: 30.87 KG/M2 | HEIGHT: 71 IN | DIASTOLIC BLOOD PRESSURE: 98 MMHG | WEIGHT: 220.5 LBS | HEART RATE: 68 BPM

## 2019-06-04 DIAGNOSIS — I21.11 STEMI INVOLVING RIGHT CORONARY ARTERY (H): ICD-10-CM

## 2019-06-04 LAB — INTERPRETATION ECG - MUSE: NORMAL

## 2019-06-04 PROCEDURE — 99214 OFFICE O/P EST MOD 30 MIN: CPT | Performed by: INTERNAL MEDICINE

## 2019-06-04 ASSESSMENT — MIFFLIN-ST. JEOR: SCORE: 1923.34

## 2019-06-04 NOTE — PROGRESS NOTES
"Cardiology Consultation      Nathan Willis MRN# [id number]   YOB: 1975 Age: 43 year old   Date of Visit 06/04/2019     Reason for consult:  Follow-up hospitalization, establish care           Assessment and Plan:     1. Recent inferior STEMI, early revascularization with minimal damage    Status post PCI of the RCA.  Medically managed moderate LAD disease.    Continue maximum dose rosuvastatin as well as beta-blocker and ACE inhibitor.    Continue dual antiplatelet therapy for likely 1 year    Follow-up with me in 1 year    We may consider further imaging including cardiac MRI if symptomatic    Okay for high intensity interval training during cardiac rehabilitation.    [employer] voice recognition software, typographical errors may be present.                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  He was just recently discharged this past weekend from having early revascularization from inferior STEMI.  He had only minor troponin elevation and no regional wall motion abnormal has hypertension and some LV hypertrophy.  He feels well with no change from previous.    We reviewed the images from his cardiac catheterization.  He does have some residual nonobstructive LAD disease.  He is on maximum dose rosuvastatin and has adequate blood pressure control at this point.    He is interested in doing the cardiac rehabilitation.  He has a great attitude about his overall health and future.  We spent time talking about overall philosophy and background as well as how culture impacts family interaction and health.    No exertional chest pain  No PND / orthopnea  No presyncope / syncope  No significant palpitations           Physical Exam:     Vitals: BP (!) 134/98   Pulse 68   Ht 1.797 m (5' 10.75\")   Wt 100 kg " (220 lb 8 oz)   BMI 30.97 kg/m    Constitutional:  cooperative, alert and oriented, well developed, well nourished, in no acute distress        Skin:  warm and dry to the touch, no apparent skin lesions or masses noted        Head:  normocephalic, no masses or lesions        Eyes:  pupils equal and round, conjunctivae and lids unremarkable, sclera white, no xanthalasma, EOMS intact, no nystagmus        ENT:  no pallor or cyanosis, dentition good        Neck:  JVP normal        Chest:  normal breath sounds, clear to auscultation, normal A-P diameter, normal symmetry, normal respiratory excursion, no use of accessory muscles        Cardiac: normal S1 and S2;no murmurs, gallops or rubs detected                  Abdomen:      benign    Extremities and Back:  no deformities, clubbing, cyanosis, erythema observed        Neurological:  no gross motor deficits;affect appropriate                    Past Medical History:   I have reviewed this patient's past medical history  Past Medical History:   Diagnosis Date     Anxiety and depression 2004    Dr. Nicole Soler             Past Surgical History:   I have reviewed this patient's past surgical history  Past Surgical History:   Procedure Laterality Date     CV CORONARY ANGIOGRAM N/A 5/31/2019    Procedure: Coronary Angiogram;  Surgeon: Baltazar Gómez MD;  Location:  HEART CARDIAC CATH LAB     CV LEFT HEART CATH N/A 5/31/2019    Procedure: Left Heart Cath;  Surgeon: Baltazar Gómez MD;  Location:  HEART CARDIAC CATH LAB     CV LEFT VENTRICULOGRAM N/A 5/31/2019    Procedure: Left Ventriculogram;  Surgeon: Baltazar Gómez MD;  Location: [facility] CARDIAC CATH LAB     CV PCI STENT DRUG ELUTING N/A 5/31/2019    Procedure: PCI Stent Drug Eluting;  Surgeon: Baltazar Gómez MD;  Location:  HEART CARDIAC CATH LAB     wisdom teeth removed  college               Social History:   I have reviewed this patient's social history  Social History     Tobacco Use      Smoking status: Never Smoker     Smokeless tobacco: Never Used   Substance Use Topics     Alcohol use: Yes     Comment: occasional             Family History:   I have reviewed this patient's family history  Family History   Problem Relation Age of Onset     Hypertension Father              Allergies:   No Known Allergies          Medications:   I have reviewed this patient's current medications  Current Outpatient Medications   Medication Sig Dispense Refill     aspirin (ASA) 81 MG EC tablet Take 1 tablet (81 mg) by mouth daily       carvedilol (COREG) 12.5 MG tablet Take 1.5 tablets (18.75 mg) by mouth 2 times daily 90 tablet 0     escitalopram (LEXAPRO) 20 MG tablet Take 1 tablet (20 mg) by mouth daily       lisinopril (PRINIVIL/ZESTRIL) 20 MG tablet Take 1 tablet (20 mg) by mouth 2 times daily 60 tablet 0     nitroGLYcerin (NITROSTAT) 0.4 MG sublingual tablet For chest pain place 1 tablet under the tongue every 5 minutes for 3 doses. If symptoms persist 5 minutes after 1st dose call 911. 25 tablet 0     rosuvastatin (CRESTOR) 40 MG tablet Take 1 tablet (40 mg) by mouth daily 30 tablet 0     ticagrelor (BRILINTA) 90 MG tablet Take 1 tablet (90 mg) by mouth every 12 hours 60 tablet 0               Review of Systems:     Review of Systems:  Skin:  Negative     Eyes:  Positive for glasses  ENT:  Negative    Respiratory:  Negative    Cardiovascular:  Negative    Gastroenterology: Negative    Genitourinary:  Negative    Musculoskeletal:  Negative    Neurologic:  Negative    Psychiatric:  Positive for anxiety;depression  Heme/Lymph/Imm:  Negative    Endocrine:  Negative                       Data:   All laboratory data reviewed  Lab Results   Component Value Date    CHOL 219 05/31/2019     Lab Results   Component Value Date    HDL 37 05/31/2019     Lab Results   Component Value Date     05/31/2019     Lab Results   Component Value Date    TRIG 203 05/31/2019     Lab Results   Component Value Date     CHOLHDLRATIO 3.9 01/13/2015     No results found for: TSH  Last Basic Metabolic Panel:  Lab Results   Component Value Date     06/02/2019      Lab Results   Component Value Date    POTASSIUM 4.1 06/02/2019     Lab Results   Component Value Date    CHLORIDE 106 06/02/2019     Lab Results   Component Value Date    NICOLE 8.9 06/02/2019     Lab Results   Component Value Date    CO2 26 06/02/2019     Lab Results   Component Value Date    BUN 14 06/02/2019     Lab Results   Component Value Date    CR 0.92 06/02/2019     Lab Results   Component Value Date     06/02/2019     Lab Results   Component Value Date    WBC 7.0 06/01/2019     Lab Results   Component Value Date    RBC 4.64 06/01/2019     Lab Results   Component Value Date    HGB 14.0 06/01/2019     Lab Results   Component Value Date    HCT 39.3 06/01/2019     Lab Results   Component Value Date    MCV 85 06/01/2019     Lab Results   Component Value Date    MCH 30.2 06/01/2019     Lab Results   Component Value Date    MCHC 35.6 06/01/2019     Lab Results   Component Value Date    RDW 12.1 06/01/2019     Lab Results   Component Value Date     06/01/2019

## 2019-06-04 NOTE — LETTER
6/4/2019    Hamilton Edge MD  [address]  [facility] [address]    RE: Nathan Willis       Dear Colleague,    I had the pleasure of seeing Nathan iWllis in the [facility].    Cardiology Consultation      Nathan Willis MRN# [id number]   YOB: 1975 Age: 43 year old   Date of Visit 06/04/2019     Reason for consult:  Follow-up hospitalization, establish care           Assessment and Plan:     1. Recent inferior STEMI, early revascularization with minimal damage    Status post PCI of the RCA.  Medically managed moderate LAD disease.    Continue maximum dose rosuvastatin as well as beta-blocker and ACE inhibitor.    Continue dual antiplatelet therapy for likely 1 year    Follow-up with me in 1 year    We may consider further imaging including cardiac MRI if symptomatic    Okay for high intensity interval training during cardiac rehabilitation.    [employer] voice recognition software, typographical errors may be present.                Chief Complaint:   Heart Problem (STEMI)           History of Present Illness:   This patient is a very pleasant 43 year old Protestant male  who went to [employer] for [employer] and [employer] for law school that I am meeting for the first time.  He was just recently discharged this past weekend from having early revascularization from inferior STEMI.  He had only minor troponin elevation and no regional wall motion abnormal has hypertension and some LV hypertrophy.  He feels well with no change from previous.    We reviewed the images from his cardiac catheterization.  He does have some residual nonobstructive LAD disease.  He is on maximum dose rosuvastatin and has adequate blood pressure control at this point.    He is interested in doing the cardiac rehabilitation.  He has a great attitude about his overall health and future.  We spent time talking about overall philosophy and background as well as how culture impacts family interaction and  "health.    No exertional chest pain  No PND / orthopnea  No presyncope / syncope  No significant palpitations           Physical Exam:     Vitals: BP (!) 134/98   Pulse 68   Ht 1.797 m (5' 10.75\")   Wt 100 kg (220 lb 8 oz)   BMI 30.97 kg/m     Constitutional:  cooperative, alert and oriented, well developed, well nourished, in no acute distress        Skin:  warm and dry to the touch, no apparent skin lesions or masses noted        Head:  normocephalic, no masses or lesions        Eyes:  pupils equal and round, conjunctivae and lids unremarkable, sclera white, no xanthalasma, EOMS intact, no nystagmus        ENT:  no pallor or cyanosis, dentition good        Neck:  JVP normal        Chest:  normal breath sounds, clear to auscultation, normal A-P diameter, normal symmetry, normal respiratory excursion, no use of accessory muscles        Cardiac: normal S1 and S2;no murmurs, gallops or rubs detected                  Abdomen:      benign    Extremities and Back:  no deformities, clubbing, cyanosis, erythema observed        Neurological:  no gross motor deficits;affect appropriate                    Past Medical History:   I have reviewed this patient's past medical history  Past Medical History:   Diagnosis Date     Anxiety and depression 2004    Dr. Nicole Soler             Past Surgical History:   I have reviewed this patient's past surgical history  Past Surgical History:   Procedure Laterality Date     CV CORONARY ANGIOGRAM N/A 5/31/2019    Procedure: Coronary Angiogram;  Surgeon: Baltazar Gómez MD;  Location:  HEART CARDIAC CATH LAB     CV LEFT HEART CATH N/A 5/31/2019    Procedure: Left Heart Cath;  Surgeon: Baltazar Gómez MD;  Location:  HEART CARDIAC CATH LAB     CV LEFT VENTRICULOGRAM N/A 5/31/2019    Procedure: Left Ventriculogram;  Surgeon: Baltazar Gómez MD;  Location:  HEART CARDIAC CATH LAB     CV PCI STENT DRUG ELUTING N/A 5/31/2019    Procedure: PCI Stent Drug Eluting;  " Surgeon: Baltazar Gómez MD;  Location:  HEART CARDIAC CATH LAB     wisdom teeth removed  [facility]               Social History:   I have reviewed this patient's social history  Social History     Tobacco Use     Smoking status: Never Smoker     Smokeless tobacco: Never Used   Substance Use Topics     Alcohol use: Yes     Comment: occasional             Family History:   I have reviewed this patient's family history  Family History   Problem Relation Age of Onset     Hypertension Father              Allergies:   No Known Allergies          Medications:   I have reviewed this patient's current medications  Current Outpatient Medications   Medication Sig Dispense Refill     aspirin (ASA) 81 MG EC tablet Take 1 tablet (81 mg) by mouth daily       carvedilol (COREG) 12.5 MG tablet Take 1.5 tablets (18.75 mg) by mouth 2 times daily 90 tablet 0     escitalopram (LEXAPRO) 20 MG tablet Take 1 tablet (20 mg) by mouth daily       lisinopril (PRINIVIL/ZESTRIL) 20 MG tablet Take 1 tablet (20 mg) by mouth 2 times daily 60 tablet 0     nitroGLYcerin (NITROSTAT) 0.4 MG sublingual tablet For chest pain place 1 tablet under the tongue every 5 minutes for 3 doses. If symptoms persist 5 minutes after 1st dose call 911. 25 tablet 0     rosuvastatin (CRESTOR) 40 MG tablet Take 1 tablet (40 mg) by mouth daily 30 tablet 0     ticagrelor (BRILINTA) 90 MG tablet Take 1 tablet (90 mg) by mouth every 12 hours 60 tablet 0               Review of Systems:     Review of Systems:  Skin:  Negative     Eyes:  Positive for glasses  ENT:  Negative    Respiratory:  Negative    Cardiovascular:  Negative    Gastroenterology: Negative    Genitourinary:  Negative    Musculoskeletal:  Negative    Neurologic:  Negative    Psychiatric:  Positive for anxiety;depression  Heme/Lymph/Imm:  Negative    Endocrine:  Negative                       Data:   All laboratory data reviewed  Lab Results   Component Value Date    CHOL 219 05/31/2019     Lab Results    Component Value Date    HDL 37 05/31/2019     Lab Results   Component Value Date     05/31/2019     Lab Results   Component Value Date    TRIG 203 05/31/2019     Lab Results   Component Value Date    CHOLHDLRATIO 3.9 01/13/2015     No results found for: TSH  Last Basic Metabolic Panel:  Lab Results   Component Value Date     06/02/2019      Lab Results   Component Value Date    POTASSIUM 4.1 06/02/2019     Lab Results   Component Value Date    CHLORIDE 106 06/02/2019     Lab Results   Component Value Date    NICOLE 8.9 06/02/2019     Lab Results   Component Value Date    CO2 26 06/02/2019     Lab Results   Component Value Date    BUN 14 06/02/2019     Lab Results   Component Value Date    CR 0.92 06/02/2019     Lab Results   Component Value Date     06/02/2019     Lab Results   Component Value Date    WBC 7.0 06/01/2019     Lab Results   Component Value Date    RBC 4.64 06/01/2019     Lab Results   Component Value Date    HGB 14.0 06/01/2019     Lab Results   Component Value Date    HCT 39.3 06/01/2019     Lab Results   Component Value Date    MCV 85 06/01/2019     Lab Results   Component Value Date    MCH 30.2 06/01/2019     Lab Results   Component Value Date    MCHC 35.6 06/01/2019     Lab Results   Component Value Date    RDW 12.1 06/01/2019     Lab Results   Component Value Date     06/01/2019       Thank you for allowing me to participate in the care of your patient.    Sincerely,     Taye Pittman MD     [facility]    cc:   Jairo Frey MD  [address] [address]

## 2019-06-06 ENCOUNTER — OFFICE VISIT (OUTPATIENT)
Dept: FAMILY MEDICINE | Facility: CLINIC | Age: 44
End: 2019-06-06
Payer: COMMERCIAL

## 2019-06-06 VITALS
HEIGHT: 71 IN | BODY MASS INDEX: 30.1 KG/M2 | HEART RATE: 75 BPM | DIASTOLIC BLOOD PRESSURE: 86 MMHG | WEIGHT: 215 LBS | SYSTOLIC BLOOD PRESSURE: 124 MMHG | TEMPERATURE: 98 F | OXYGEN SATURATION: 98 %

## 2019-06-06 DIAGNOSIS — I25.10 ASCVD (ARTERIOSCLEROTIC CARDIOVASCULAR DISEASE): ICD-10-CM

## 2019-06-06 DIAGNOSIS — I21.11 STEMI INVOLVING RIGHT CORONARY ARTERY (H): Primary | ICD-10-CM

## 2019-06-06 PROCEDURE — 99203 OFFICE O/P NEW LOW 30 MIN: CPT | Performed by: INTERNAL MEDICINE

## 2019-06-06 RX ORDER — CARVEDILOL 12.5 MG/1
18.75 TABLET ORAL 2 TIMES DAILY
Qty: 180 TABLET | Refills: 3 | Status: SHIPPED | OUTPATIENT
Start: 2019-06-06 | End: 2020-02-18

## 2019-06-06 RX ORDER — ROSUVASTATIN CALCIUM 40 MG/1
40 TABLET, COATED ORAL DAILY
Qty: 90 TABLET | Refills: 3 | Status: SHIPPED | OUTPATIENT
Start: 2019-06-06 | End: 2020-06-03

## 2019-06-06 RX ORDER — LISINOPRIL 20 MG/1
20 TABLET ORAL
Qty: 180 TABLET | Refills: 3 | Status: SHIPPED | OUTPATIENT
Start: 2019-06-06 | End: 2020-06-03

## 2019-06-06 ASSESSMENT — MIFFLIN-ST. JEOR: SCORE: 1898.39

## 2019-06-06 ASSESSMENT — PATIENT HEALTH QUESTIONNAIRE - PHQ9: SUM OF ALL RESPONSES TO PHQ QUESTIONS 1-9: 0

## 2019-06-06 NOTE — PATIENT INSTRUCTIONS
Call if any chest pain or shortness of breath or new symptoms    Make sure to eat a healthy diet and try to get your weight down.    Keep up the exercise    Let me know if your blood pressure is not around 120/80 on a regular basis.  Be sure to sit for 5 minutes prior to checking it    See me fasting in 3 months    Hamilton Edge M.D.

## 2019-06-06 NOTE — PROGRESS NOTES
[place]     Nathan Willis is a 43 year old male who presents to clinic today for the following health issues:    [facility]       Hospital Follow-up Visit:    Hospital/Nursing Home/IP Rehab Facility: [facility]  Date of Admission: 19  Date of Discharge: 19  Reason(s) for Admission: STEM            Problems taking medications regularly:  None       Medication changes since discharge: None       Problems adhering to non-medication therapy:  None    Summary of hospitalization:  [facility] discharge summary reviewed  Diagnostic Tests/Treatments reviewed.  Follow up needed: none  Other Healthcare Providers Involved in Patient s Care:         Specialist appointment - cards  Update since discharge: improved.     Post Discharge Medication Reconciliation: discharge medications reconciled, continue medications without change.  Plan of care communicated with patient     Coding guidelines for this visit:  Type of Medical   Decision Making Face-to-Face Visit       within 7 Days of discharge Face-to-Face Visit        within 14 days of discharge   Moderate Complexity [id number]   High Complexity [id number]          This is a 43-year-old male who presents for follow-up to his hospital stay for an acute MI.  As noted and reviewed he had emergent angioplasty done with some disease elsewhere and a normal EF.  He was found to have LVH with a possible outflow obstruction.  Prior to his admission he was on Lexapro only and now he has on the meds listed.  He has no known history of hypertension but does have the LVH.  His parents  in their 40s had an auto accident so that history is not obtainable.  He did see cardiology as noted since his hospital stay.    He is doing quite well.  He was having chest tightness and this has resolved.  No shortness of breath or dizziness.  A complete review of systems is negative.  He has not started working out again yet but prior to this event worked up 5 to 6 days a  week.    Past Medical History:   Diagnosis Date     Anxiety and depression 2004    Dr. Nicole Soler     ASCVD (arteriosclerotic cardiovascular disease) 06/2019    stemi, emergent angio done with 90% mid rca very large vessel, ptca and stented, 2nd marginal 30%, ostial 30%, mid lad 50% sequential lesions, nl ef, possible outflow obstruction on echo     Past Surgical History:   Procedure Laterality Date     CV CORONARY ANGIOGRAM N/A 5/31/2019    Procedure: Coronary Angiogram;  Surgeon: Baltazar Gómez MD;  Location:  HEART CARDIAC CATH LAB     CV LEFT HEART CATH N/A 5/31/2019    Procedure: Left Heart Cath;  Surgeon: Baltazar Gómez MD;  Location:  HEART CARDIAC CATH LAB     CV LEFT VENTRICULOGRAM N/A 5/31/2019    Procedure: Left Ventriculogram;  Surgeon: Baltazar Gómez MD;  Location:  HEART CARDIAC CATH LAB     CV PCI STENT DRUG ELUTING N/A 5/31/2019    Procedure: PCI Stent Drug Eluting;  Surgeon: Baltazar Gómez MD;  Location:  HEART CARDIAC CATH LAB     wisdom teeth removed  college     Social History     Socioeconomic History     Marital status:      Spouse name: Not on file     Number of children: 3     Years of education: Not on file     Highest education level: Not on file   Occupational History     Occupation:    Social Needs     Financial resource strain: Not on file     Food insecurity:     Worry: Not on file     Inability: Not on file     Transportation needs:     Medical: Not on file     Non-medical: Not on file   Tobacco Use     Smoking status: Never Smoker     Smokeless tobacco: Never Used   Substance and Sexual Activity     Alcohol use: Yes     Comment: occasional     Drug use: No     Sexual activity: Yes   Lifestyle     Physical activity:     Days per week: Not on file     Minutes per session: Not on file     Stress: Not on file   Relationships     Social connections:     Talks on phone: Not on file     Gets together: Not on file     Attends Rastafari  "service: Not on file     Active member of club or organization: Not on file     Attends meetings of clubs or organizations: Not on file     Relationship status: Not on file     Intimate partner violence:     Fear of current or ex partner: Not on file     Emotionally abused: Not on file     Physically abused: Not on file     Forced sexual activity: Not on file   Other Topics Concern     Not on file   Social History Narrative     Not on file     Current Outpatient Medications   Medication Sig Dispense Refill     aspirin (ASA) 81 MG EC tablet Take 1 tablet (81 mg) by mouth daily       carvedilol (COREG) 12.5 MG tablet Take 1.5 tablets (18.75 mg) by mouth 2 times daily 180 tablet 3     escitalopram (LEXAPRO) 20 MG tablet Take 1 tablet (20 mg) by mouth daily       lisinopril (PRINIVIL/ZESTRIL) 20 MG tablet Take 1 tablet (20 mg) by mouth 2 times daily 180 tablet 3     rosuvastatin (CRESTOR) 40 MG tablet Take 1 tablet (40 mg) by mouth daily 90 tablet 3     ticagrelor (BRILINTA) 90 MG tablet Take 1 tablet (90 mg) by mouth every 12 hours 180 tablet 3     nitroGLYcerin (NITROSTAT) 0.4 MG sublingual tablet For chest pain place 1 tablet under the tongue every 5 minutes for 3 doses. If symptoms persist 5 minutes after 1st dose call 911. (Patient not taking: Reported on 6/6/2019) 25 tablet 0     No Known Allergies  FAMILY HISTORY NOTED AND REVIEWED    REVIEW OF SYSTEMS: above    PHYSICAL EXAM    /86   Pulse 75   Temp 98  F (36.7  C) (Oral)   Ht 1.797 m (5' 10.75\")   Wt 97.5 kg (215 lb)   SpO2 98%   BMI 30.20 kg/m    Blood pressure same bilat  Patient appears non toxic  Mouth - tongue midline and within normal limits, mucous membranes and posterior pharynx within normal limits, no lesions seen.  Neck - no masses, lesions or tenderness  Nodes - no supraclavicular, cervical or axially adenopathy .  Lungs - clear, normal flow  Cardiovascular - regular rate and rhythm, no murmer, rub or gallop, no jvp or edema, carotids " within normal limits, no bruits.  Abdomen - normal active bowel sounds, soft, non tender, no masses, guarding or rebound, no hepatosplenomegaly      Labs noted    ASSESSMENT:  1. Nstemi, stable, normal ef, post ptca/stent  2. Elevated cholesterol on high dose statin  3. Hypertension, suspect real      PLAN:  I discussed with the patient and stressed the need for regular exercise, healthy diet, and weight loss.    I discussed with the patient that he must not stop his aspirin or Brilinta due to the risk of acute stent closure and he understands this  I told the patient that if he has any recurrent chest discomfort or new symptoms he needs to let me know right away.   With respect to his LVH and possible outflow obstruction a follow-up echo will be done in a year  He will see me in 3 months and fasting labs will be done then          Hamilton Edge M.D.

## 2019-06-12 ENCOUNTER — HOSPITAL ENCOUNTER (OUTPATIENT)
Dept: CARDIAC REHAB | Facility: CLINIC | Age: 44
End: 2019-06-12
Attending: INTERNAL MEDICINE
Payer: COMMERCIAL

## 2019-06-12 DIAGNOSIS — I21.19 ST ELEVATION MYOCARDIAL INFARCTION (STEMI) INVOLVING OTHER CORONARY ARTERY OF INFERIOR WALL (H): ICD-10-CM

## 2019-06-12 PROCEDURE — 40000575 ZZH STATISTIC OP CARDIAC VISIT #2

## 2019-06-12 PROCEDURE — 93798 PHYS/QHP OP CAR RHAB W/ECG: CPT

## 2019-06-12 PROCEDURE — 93797 PHYS/QHP OP CAR RHAB WO ECG: CPT

## 2019-06-12 PROCEDURE — 40000116 ZZH STATISTIC OP CR VISIT

## 2019-06-19 ENCOUNTER — HOSPITAL ENCOUNTER (OUTPATIENT)
Dept: CARDIAC REHAB | Facility: CLINIC | Age: 44
End: 2019-06-19
Attending: INTERNAL MEDICINE
Payer: COMMERCIAL

## 2019-06-19 PROCEDURE — 40000116 ZZH STATISTIC OP CR VISIT

## 2019-06-19 PROCEDURE — 93798 PHYS/QHP OP CAR RHAB W/ECG: CPT

## 2019-06-26 ENCOUNTER — HOSPITAL ENCOUNTER (OUTPATIENT)
Dept: CARDIAC REHAB | Facility: CLINIC | Age: 44
End: 2019-06-26
Attending: INTERNAL MEDICINE
Payer: COMMERCIAL

## 2019-06-26 PROCEDURE — 40000116 ZZH STATISTIC OP CR VISIT: Performed by: OCCUPATIONAL THERAPIST

## 2019-06-26 PROCEDURE — 93798 PHYS/QHP OP CAR RHAB W/ECG: CPT | Performed by: OCCUPATIONAL THERAPIST

## 2019-06-28 ENCOUNTER — HOSPITAL ENCOUNTER (OUTPATIENT)
Dept: CARDIAC REHAB | Facility: CLINIC | Age: 44
End: 2019-06-28
Attending: INTERNAL MEDICINE
Payer: COMMERCIAL

## 2019-06-28 PROCEDURE — 93798 PHYS/QHP OP CAR RHAB W/ECG: CPT | Performed by: OCCUPATIONAL THERAPIST

## 2019-06-28 PROCEDURE — 40000116 ZZH STATISTIC OP CR VISIT: Performed by: OCCUPATIONAL THERAPIST

## 2019-07-03 ENCOUNTER — HOSPITAL ENCOUNTER (OUTPATIENT)
Dept: CARDIAC REHAB | Facility: CLINIC | Age: 44
End: 2019-07-03
Attending: INTERNAL MEDICINE
Payer: COMMERCIAL

## 2019-07-03 PROCEDURE — 93798 PHYS/QHP OP CAR RHAB W/ECG: CPT

## 2019-07-03 PROCEDURE — 40000116 ZZH STATISTIC OP CR VISIT

## 2019-07-05 ENCOUNTER — HOSPITAL ENCOUNTER (OUTPATIENT)
Dept: CARDIAC REHAB | Facility: CLINIC | Age: 44
End: 2019-07-05
Attending: INTERNAL MEDICINE
Payer: COMMERCIAL

## 2019-07-05 PROCEDURE — 40000116 ZZH STATISTIC OP CR VISIT

## 2019-07-05 PROCEDURE — 93798 PHYS/QHP OP CAR RHAB W/ECG: CPT

## 2019-07-08 ENCOUNTER — HOSPITAL ENCOUNTER (OUTPATIENT)
Dept: CARDIAC REHAB | Facility: CLINIC | Age: 44
End: 2019-07-08
Attending: INTERNAL MEDICINE
Payer: COMMERCIAL

## 2019-07-08 PROCEDURE — 93798 PHYS/QHP OP CAR RHAB W/ECG: CPT

## 2019-07-08 PROCEDURE — 40000116 ZZH STATISTIC OP CR VISIT

## 2019-07-09 ENCOUNTER — HOSPITAL ENCOUNTER (OUTPATIENT)
Dept: CARDIAC REHAB | Facility: CLINIC | Age: 44
End: 2019-07-09
Payer: COMMERCIAL

## 2019-07-09 PROCEDURE — 40000116 ZZH STATISTIC OP CR VISIT: Performed by: CLINICAL EXERCISE PHYSIOLOGIST

## 2019-07-09 PROCEDURE — 93798 PHYS/QHP OP CAR RHAB W/ECG: CPT | Performed by: CLINICAL EXERCISE PHYSIOLOGIST

## 2019-07-22 ENCOUNTER — HOSPITAL ENCOUNTER (OUTPATIENT)
Dept: CARDIAC REHAB | Facility: CLINIC | Age: 44
End: 2019-07-22
Attending: INTERNAL MEDICINE
Payer: COMMERCIAL

## 2019-07-22 PROCEDURE — 93798 PHYS/QHP OP CAR RHAB W/ECG: CPT

## 2019-07-22 PROCEDURE — 40000116 ZZH STATISTIC OP CR VISIT

## 2019-07-24 ENCOUNTER — HOSPITAL ENCOUNTER (OUTPATIENT)
Dept: CARDIAC REHAB | Facility: CLINIC | Age: 44
End: 2019-07-24
Attending: INTERNAL MEDICINE
Payer: COMMERCIAL

## 2019-07-24 PROCEDURE — 40000116 ZZH STATISTIC OP CR VISIT: Performed by: OCCUPATIONAL THERAPIST

## 2019-07-24 PROCEDURE — 93798 PHYS/QHP OP CAR RHAB W/ECG: CPT | Performed by: OCCUPATIONAL THERAPIST

## 2019-07-26 ENCOUNTER — HOSPITAL ENCOUNTER (OUTPATIENT)
Dept: CARDIAC REHAB | Facility: CLINIC | Age: 44
End: 2019-07-26
Attending: INTERNAL MEDICINE
Payer: COMMERCIAL

## 2019-07-26 PROCEDURE — 40000116 ZZH STATISTIC OP CR VISIT

## 2019-07-26 PROCEDURE — 93798 PHYS/QHP OP CAR RHAB W/ECG: CPT

## 2019-08-08 ENCOUNTER — HOSPITAL ENCOUNTER (OUTPATIENT)
Dept: CARDIAC REHAB | Facility: CLINIC | Age: 44
End: 2019-08-08
Attending: INTERNAL MEDICINE
Payer: COMMERCIAL

## 2019-08-08 PROCEDURE — 93798 PHYS/QHP OP CAR RHAB W/ECG: CPT | Performed by: REHABILITATION PRACTITIONER

## 2019-08-08 PROCEDURE — 40000116 ZZH STATISTIC OP CR VISIT: Performed by: REHABILITATION PRACTITIONER

## 2019-09-06 ENCOUNTER — OFFICE VISIT (OUTPATIENT)
Dept: FAMILY MEDICINE | Facility: CLINIC | Age: 44
End: 2019-09-06
Payer: COMMERCIAL

## 2019-09-06 VITALS
TEMPERATURE: 97.3 F | HEIGHT: 71 IN | BODY MASS INDEX: 28.35 KG/M2 | WEIGHT: 202.5 LBS | SYSTOLIC BLOOD PRESSURE: 115 MMHG | DIASTOLIC BLOOD PRESSURE: 74 MMHG | OXYGEN SATURATION: 99 % | HEART RATE: 50 BPM

## 2019-09-06 DIAGNOSIS — I25.10 ASCVD (ARTERIOSCLEROTIC CARDIOVASCULAR DISEASE): Primary | ICD-10-CM

## 2019-09-06 DIAGNOSIS — D64.9 LOW HEMOGLOBIN: ICD-10-CM

## 2019-09-06 LAB
ANION GAP SERPL CALCULATED.3IONS-SCNC: 7 MMOL/L (ref 3–14)
BUN SERPL-MCNC: 19 MG/DL (ref 7–30)
CALCIUM SERPL-MCNC: 9.5 MG/DL (ref 8.5–10.1)
CHLORIDE SERPL-SCNC: 107 MMOL/L (ref 94–109)
CHOLEST SERPL-MCNC: 99 MG/DL
CO2 SERPL-SCNC: 25 MMOL/L (ref 20–32)
CREAT SERPL-MCNC: 1.05 MG/DL (ref 0.66–1.25)
ERYTHROCYTE [DISTWIDTH] IN BLOOD BY AUTOMATED COUNT: 12.6 % (ref 10–15)
GFR SERPL CREATININE-BSD FRML MDRD: 87 ML/MIN/{1.73_M2}
GLUCOSE SERPL-MCNC: 81 MG/DL (ref 70–99)
HCT VFR BLD AUTO: 37.4 % (ref 40–53)
HDLC SERPL-MCNC: 44 MG/DL
HGB BLD-MCNC: 13 G/DL (ref 13.3–17.7)
LDLC SERPL CALC-MCNC: 39 MG/DL
MCH RBC QN AUTO: 31.3 PG (ref 26.5–33)
MCHC RBC AUTO-ENTMCNC: 34.8 G/DL (ref 31.5–36.5)
MCV RBC AUTO: 90 FL (ref 78–100)
NONHDLC SERPL-MCNC: 55 MG/DL
PLATELET # BLD AUTO: 221 10E9/L (ref 150–450)
POTASSIUM SERPL-SCNC: 4.3 MMOL/L (ref 3.4–5.3)
RBC # BLD AUTO: 4.16 10E12/L (ref 4.4–5.9)
SODIUM SERPL-SCNC: 139 MMOL/L (ref 133–144)
TRIGL SERPL-MCNC: 78 MG/DL
WBC # BLD AUTO: 5.7 10E9/L (ref 4–11)

## 2019-09-06 PROCEDURE — 85027 COMPLETE CBC AUTOMATED: CPT | Performed by: INTERNAL MEDICINE

## 2019-09-06 PROCEDURE — 80061 LIPID PANEL: CPT | Performed by: INTERNAL MEDICINE

## 2019-09-06 PROCEDURE — 99213 OFFICE O/P EST LOW 20 MIN: CPT | Performed by: INTERNAL MEDICINE

## 2019-09-06 PROCEDURE — 36415 COLL VENOUS BLD VENIPUNCTURE: CPT | Performed by: INTERNAL MEDICINE

## 2019-09-06 PROCEDURE — 80048 BASIC METABOLIC PNL TOTAL CA: CPT | Performed by: INTERNAL MEDICINE

## 2019-09-06 ASSESSMENT — MIFFLIN-ST. JEOR: SCORE: 1841.69

## 2019-09-06 NOTE — PROGRESS NOTES
Patient presents in follow-up for his coronary artery disease.  As noted, in June he had a ST elevation MI.  Angiogram showed a 90% mid RCA which was a very large vessel which was stented and angioplastied.  He had some disease elsewhere but not flow-limiting.  He had a normal EF with possible outflow obstruction on echo.  He was not on lipid agents and or blood pressure medications.    He is doing super.  His exercise regimen is wonderful and he watches his diet.  Other than occasionally getting slightly lightheaded when he gets up off the ground he has no symptoms at all and review of systems.    Past Medical History:   Diagnosis Date     Anxiety and depression 2004    Dr. Nicole Soler     ASCVD (arteriosclerotic cardiovascular disease) 06/2019    stemi, emergent angio done with 90% mid rca very large vessel, ptca and stented, 2nd marginal 30%, ostial 30%, mid lad 50% sequential lesions, nl ef, possible outflow obstruction on echo     Past Surgical History:   Procedure Laterality Date     CV CORONARY ANGIOGRAM N/A 5/31/2019    Procedure: Coronary Angiogram;  Surgeon: Baltazar Gómez MD;  Location:  HEART CARDIAC CATH LAB     CV LEFT HEART CATH N/A 5/31/2019    Procedure: Left Heart Cath;  Surgeon: Baltaazr Gómez MD;  Location:  HEART CARDIAC CATH LAB     CV LEFT VENTRICULOGRAM N/A 5/31/2019    Procedure: Left Ventriculogram;  Surgeon: Baltazar Gómez MD;  Location: [facility] CARDIAC CATH LAB     CV PCI STENT DRUG ELUTING N/A 5/31/2019    Procedure: PCI Stent Drug Eluting;  Surgeon: Baltazar Gómez MD;  Location:  HEART CARDIAC CATH LAB     wisdom teeth removed  college     Social History     Socioeconomic History     Marital status:      Spouse name: Not on file     Number of children: 3     Years of education: Not on file     Highest education level: Not on file   Occupational History     Occupation:    Social Needs     Financial resource strain: Not on file     Food  insecurity:     Worry: Not on file     Inability: Not on file     Transportation needs:     Medical: Not on file     Non-medical: Not on file   Tobacco Use     Smoking status: Never Smoker     Smokeless tobacco: Never Used   Substance and Sexual Activity     Alcohol use: Yes     Comment: occasional     Drug use: No     Sexual activity: Yes   Lifestyle     Physical activity:     Days per week: Not on file     Minutes per session: Not on file     Stress: Not on file   Relationships     Social connections:     Talks on phone: Not on file     Gets together: Not on file     Attends Synagogue service: Not on file     Active member of club or organization: Not on file     Attends meetings of clubs or organizations: Not on file     Relationship status: Not on file     Intimate partner violence:     Fear of current or ex partner: Not on file     Emotionally abused: Not on file     Physically abused: Not on file     Forced sexual activity: Not on file   Other Topics Concern     Not on file   Social History Narrative     Not on file     Current Outpatient Medications   Medication Sig Dispense Refill     aspirin (ASA) 81 MG EC tablet Take 1 tablet (81 mg) by mouth daily       carvedilol (COREG) 12.5 MG tablet Take 1.5 tablets (18.75 mg) by mouth 2 times daily 180 tablet 3     escitalopram (LEXAPRO) 20 MG tablet Take 1 tablet (20 mg) by mouth daily       lisinopril (PRINIVIL/ZESTRIL) 20 MG tablet Take 1 tablet (20 mg) by mouth 2 times daily 180 tablet 3     nitroGLYcerin (NITROSTAT) 0.4 MG sublingual tablet For chest pain place 1 tablet under the tongue every 5 minutes for 3 doses. If symptoms persist 5 minutes after 1st dose call 911. 25 tablet 0     rosuvastatin (CRESTOR) 40 MG tablet Take 1 tablet (40 mg) by mouth daily 90 tablet 3     ticagrelor (BRILINTA) 90 MG tablet Take 1 tablet (90 mg) by mouth every 12 hours 180 tablet 3     No Known Allergies  FAMILY HISTORY NOTED AND REVIEWED    REVIEW OF SYSTEMS: above    PHYSICAL  "EXAM    /74 (BP Location: Left arm, Patient Position: Sitting, Cuff Size: Adult Regular)   Pulse 50   Temp 97.3  F (36.3  C) (Oral)   Ht 1.797 m (5' 10.75\")   Wt 91.9 kg (202 lb 8 oz)   SpO2 99%   BMI 28.44 kg/m      Patient appears non toxic  Mouth - tongue midline and within normal limits, mucous membranes and posterior pharynx within normal limits, no lesions seen.  Neck - no masses, lesions or tenderness  Nodes - no supraclavicular, cervical or axially adenopathy .  Lungs - clear, normal flow  Cardiovascular - regular rate and rhythm, no murmer, rub or gallop, no jvp or edema, carotids within normal limits, no bruits.  Abdomen - normal active bowel sounds, soft, non tender, no masses, guarding or rebound, no hepatosplenomegaly      Labs sent    ASSESSMENT:  Cad, young patient, doing super, on meds for prevention    PLAN:  brillenta for one year  Asa  Labs today  Call if any symptoms  Flu shot at work  Follow up 6 months      Hamilton Edge M.D.                  "

## 2019-09-08 NOTE — RESULT ENCOUNTER NOTE
Chacorta,    For the most part your labs look very good.  Your cholesterol is super at 99.  The good cholesterol or hdl is very good at 44 and the bad or ldl is super at 39.  Your sugar, blood salts and kidney tests are normal.    For some reason your hemoglobin is low, meaning slight anemia. Have you donated blood lately?  Any prior anemia?  Any signs of blood loss such as bloody or black stools, stomach upset, trouble swallowing or heartburn?  Please send me a note back.    Hamilton

## 2019-09-20 DIAGNOSIS — D64.9 LOW HEMOGLOBIN: ICD-10-CM

## 2019-09-20 LAB
BASOPHILS # BLD AUTO: 0 10E9/L (ref 0–0.2)
BASOPHILS NFR BLD AUTO: 0.4 %
DIFFERENTIAL METHOD BLD: ABNORMAL
EOSINOPHIL # BLD AUTO: 0.1 10E9/L (ref 0–0.7)
EOSINOPHIL NFR BLD AUTO: 2.5 %
ERYTHROCYTE [DISTWIDTH] IN BLOOD BY AUTOMATED COUNT: 12.1 % (ref 10–15)
FERRITIN SERPL-MCNC: 239 NG/ML (ref 26–388)
HCT VFR BLD AUTO: 37.1 % (ref 40–53)
HGB BLD-MCNC: 12.8 G/DL (ref 13.3–17.7)
IRON SATN MFR SERPL: NORMAL % (ref 15–46)
IRON SERPL-MCNC: NORMAL UG/DL (ref 35–180)
LYMPHOCYTES # BLD AUTO: 1.2 10E9/L (ref 0.8–5.3)
LYMPHOCYTES NFR BLD AUTO: 25.6 %
MCH RBC QN AUTO: 31.7 PG (ref 26.5–33)
MCHC RBC AUTO-ENTMCNC: 34.5 G/DL (ref 31.5–36.5)
MCV RBC AUTO: 92 FL (ref 78–100)
MONOCYTES # BLD AUTO: 0.5 10E9/L (ref 0–1.3)
MONOCYTES NFR BLD AUTO: 11 %
NEUTROPHILS # BLD AUTO: 2.9 10E9/L (ref 1.6–8.3)
NEUTROPHILS NFR BLD AUTO: 60.5 %
PLATELET # BLD AUTO: 212 10E9/L (ref 150–450)
RBC # BLD AUTO: 4.04 10E12/L (ref 4.4–5.9)
TIBC SERPL-MCNC: NORMAL UG/DL (ref 240–430)
VIT B12 SERPL-MCNC: 381 PG/ML (ref 193–986)
WBC # BLD AUTO: 4.8 10E9/L (ref 4–11)

## 2019-09-20 PROCEDURE — 82728 ASSAY OF FERRITIN: CPT | Performed by: INTERNAL MEDICINE

## 2019-09-20 PROCEDURE — 83540 ASSAY OF IRON: CPT | Performed by: INTERNAL MEDICINE

## 2019-09-20 PROCEDURE — 85025 COMPLETE CBC W/AUTO DIFF WBC: CPT | Performed by: INTERNAL MEDICINE

## 2019-09-20 PROCEDURE — 00000402 ZZHCL STATISTIC TOTAL PROTEIN: Performed by: INTERNAL MEDICINE

## 2019-09-20 PROCEDURE — 84165 PROTEIN E-PHORESIS SERUM: CPT | Performed by: INTERNAL MEDICINE

## 2019-09-20 PROCEDURE — 82607 VITAMIN B-12: CPT | Performed by: INTERNAL MEDICINE

## 2019-09-20 PROCEDURE — 83550 IRON BINDING TEST: CPT | Performed by: INTERNAL MEDICINE

## 2019-09-23 LAB
ALBUMIN SERPL ELPH-MCNC: 4.3 G/DL (ref 3.7–5.1)
ALPHA1 GLOB SERPL ELPH-MCNC: 0.3 G/DL (ref 0.2–0.4)
ALPHA2 GLOB SERPL ELPH-MCNC: 0.9 G/DL (ref 0.5–0.9)
B-GLOBULIN SERPL ELPH-MCNC: 0.7 G/DL (ref 0.6–1)
GAMMA GLOB SERPL ELPH-MCNC: 1.2 G/DL (ref 0.7–1.6)
M PROTEIN SERPL ELPH-MCNC: 0 G/DL
PROT PATTERN SERPL ELPH-IMP: NORMAL

## 2019-09-24 ENCOUNTER — TELEPHONE (OUTPATIENT)
Dept: FAMILY MEDICINE | Facility: CLINIC | Age: 44
End: 2019-09-24

## 2019-09-24 DIAGNOSIS — D64.9 LOW HEMOGLOBIN: Primary | ICD-10-CM

## 2019-09-24 LAB — FOLATE SERPL-MCNC: NORMAL NG/ML

## 2019-09-24 NOTE — TELEPHONE ENCOUNTER
PCP,    Please see message below. Lab unable to complete folate order as but sample hemolized. Lab had to cancel the order and pt will need to repeat.  Pended new order    Please review and authorize if appropriate.    Thank you,   Wolf SANTO RN

## 2019-09-24 NOTE — TELEPHONE ENCOUNTER
Reason for Call: Request for an order or referral:    Order or referral being requested: Lab folic acid    Date needed: as soon as possible    Has the patient been seen by the PCP for this problem? YES    Additional comments: Lab calling to report sample hemolized, lab is canceling will need to repeat.      Phone number Patient can be reached at:  Other phone number:  [phone]    Best Time:  any    Can we leave a detailed message on this number?  YES    Call taken on 9/24/2019 at 4:37 PM by Lorena Whitehead

## 2019-09-25 NOTE — TELEPHONE ENCOUNTER
I called patient and discussed with him the results.   He feels fine, no c/o.    Will get follow up labs in 1 month, patient agrees    Hamilton Edge M.D.

## 2019-10-18 DIAGNOSIS — D64.9 LOW HEMOGLOBIN: ICD-10-CM

## 2019-10-18 LAB
BASOPHILS # BLD AUTO: 0.1 10E9/L (ref 0–0.2)
BASOPHILS NFR BLD AUTO: 1.2 %
DIFFERENTIAL METHOD BLD: ABNORMAL
EOSINOPHIL # BLD AUTO: 0.1 10E9/L (ref 0–0.7)
EOSINOPHIL NFR BLD AUTO: 2.3 %
ERYTHROCYTE [DISTWIDTH] IN BLOOD BY AUTOMATED COUNT: 11.9 % (ref 10–15)
FOLATE SERPL-MCNC: 16 NG/ML
HCT VFR BLD AUTO: 36.3 % (ref 40–53)
HGB BLD-MCNC: 12.5 G/DL (ref 13.3–17.7)
IRON SATN MFR SERPL: 28 % (ref 15–46)
IRON SERPL-MCNC: 81 UG/DL (ref 35–180)
LYMPHOCYTES # BLD AUTO: 1.5 10E9/L (ref 0.8–5.3)
LYMPHOCYTES NFR BLD AUTO: 28.9 %
MCH RBC QN AUTO: 31.6 PG (ref 26.5–33)
MCHC RBC AUTO-ENTMCNC: 34.4 G/DL (ref 31.5–36.5)
MCV RBC AUTO: 92 FL (ref 78–100)
MONOCYTES # BLD AUTO: 0.6 10E9/L (ref 0–1.3)
MONOCYTES NFR BLD AUTO: 12.2 %
NEUTROPHILS # BLD AUTO: 2.9 10E9/L (ref 1.6–8.3)
NEUTROPHILS NFR BLD AUTO: 55.4 %
PLATELET # BLD AUTO: 204 10E9/L (ref 150–450)
RBC # BLD AUTO: 3.96 10E12/L (ref 4.4–5.9)
RETICS # AUTO: 70 10E9/L (ref 25–95)
RETICS/RBC NFR AUTO: 1.7 % (ref 0.5–2)
TIBC SERPL-MCNC: 290 UG/DL (ref 240–430)
WBC # BLD AUTO: 5.2 10E9/L (ref 4–11)

## 2019-10-18 PROCEDURE — 85025 COMPLETE CBC W/AUTO DIFF WBC: CPT | Performed by: INTERNAL MEDICINE

## 2019-10-18 PROCEDURE — 82746 ASSAY OF FOLIC ACID SERUM: CPT | Performed by: INTERNAL MEDICINE

## 2019-10-18 PROCEDURE — 83540 ASSAY OF IRON: CPT | Performed by: INTERNAL MEDICINE

## 2019-10-18 PROCEDURE — 36415 COLL VENOUS BLD VENIPUNCTURE: CPT | Performed by: INTERNAL MEDICINE

## 2019-10-18 PROCEDURE — 85060 BLOOD SMEAR INTERPRETATION: CPT | Performed by: INTERNAL MEDICINE

## 2019-10-18 PROCEDURE — 83550 IRON BINDING TEST: CPT | Performed by: INTERNAL MEDICINE

## 2019-10-18 PROCEDURE — 85045 AUTOMATED RETICULOCYTE COUNT: CPT | Performed by: INTERNAL MEDICINE

## 2019-10-21 ENCOUNTER — TELEPHONE (OUTPATIENT)
Dept: FAMILY MEDICINE | Facility: CLINIC | Age: 44
End: 2019-10-21

## 2019-10-21 DIAGNOSIS — D64.9 LOW HEMOGLOBIN: Primary | ICD-10-CM

## 2019-10-21 LAB — COPATH REPORT: NORMAL

## 2019-10-21 NOTE — TELEPHONE ENCOUNTER
I called and spoke with the patient last night regarding his ongoing slightly low hemoglobin.  No cause is identified on the current labs.  I will have him see hematology.  He agrees.    Hamilton Edge M.D.

## 2019-10-21 NOTE — RESULT ENCOUNTER NOTE
Chacorta,    You should be contacted by the hematology clinic in the next 2 days.  If you are not let me know.    Hamilton

## 2019-10-22 ENCOUNTER — TELEPHONE (OUTPATIENT)
Dept: ONCOLOGY | Facility: CLINIC | Age: 44
End: 2019-10-22

## 2019-10-22 NOTE — TELEPHONE ENCOUNTER
ONCOLOGY INTAKE: Records Information      APPT INFORMATION:  Referring provider:  Dr. Hamilton Edge  Referring provider s clinic:  [facility]  Reason for visit/diagnosis:  Low hemoglobin [D64.9]  Has patient been notified of appointment date and time?: NA    RECORDS INFORMATION:  Were the records received with the referral (via [employer])? Internal Referral    ADDITIONAL INFORMATION:  Left VM with hours and phone. Letter sent for follow up. Referral in Epic

## 2019-10-22 NOTE — TELEPHONE ENCOUNTER
ONCOLOGY INTAKE: Records Information      APPT INFORMATION:  Referring provider:  Dr. Hamilton Edge MD  Referring provider s clinic:  [facility]  Reason for visit/diagnosis:  Low hemoglobin  Has patient been notified of appointment date and time?: yes, per pt    RECORDS INFORMATION:  Were the records received with the referral (via [employer])? No, internal referral    Has patient been seen for any external appt for this diagnosis? no    If yes, where? na    Has patient had any imaging or procedures outside of [place] for this condition? no      If Yes, where? na    ADDITIONAL INFORMATION:  na

## 2019-10-23 NOTE — TELEPHONE ENCOUNTER
RECORDS STATUS - ALL OTHER DIAGNOSIS      RECORDS RECEIVED FROM: [facility]   DATE RECEIVED: 10/23/19   NOTES STATUS DETAILS   OFFICE NOTE from referring provider Hamilton Treadwell MD - Via [employer] 10/22/19   OFFICE NOTE from medical oncologist     DISCHARGE SUMMARY from hospital     DISCHARGE REPORT from the ER     OPERATIVE REPORT     MEDICATION LIST [facility] 6/6/19   CLINICAL TRIAL TREATMENTS TO DATE     LABS     PATHOLOGY REPORTS NA    ANYTHING RELATED TO DIAGNOSIS Epic 10/18/19   GENONOMIC TESTING     TYPE:     IMAGING (NEED IMAGES & REPORT)     CT SCANS     MRI     MAMMO     ULTRASOUND     PET

## 2019-11-20 DIAGNOSIS — D64.9 LOW HEMOGLOBIN: ICD-10-CM

## 2019-11-20 LAB
BASOPHILS # BLD AUTO: 0 10E9/L (ref 0–0.2)
BASOPHILS NFR BLD AUTO: 0.5 %
DIFFERENTIAL METHOD BLD: ABNORMAL
EOSINOPHIL # BLD AUTO: 0.1 10E9/L (ref 0–0.7)
EOSINOPHIL NFR BLD AUTO: 2.3 %
ERYTHROCYTE [DISTWIDTH] IN BLOOD BY AUTOMATED COUNT: 11.9 % (ref 10–15)
HCT VFR BLD AUTO: 38 % (ref 40–53)
HGB BLD-MCNC: 12.9 G/DL (ref 13.3–17.7)
LYMPHOCYTES # BLD AUTO: 1.5 10E9/L (ref 0.8–5.3)
LYMPHOCYTES NFR BLD AUTO: 27.1 %
MCH RBC QN AUTO: 30.8 PG (ref 26.5–33)
MCHC RBC AUTO-ENTMCNC: 33.9 G/DL (ref 31.5–36.5)
MCV RBC AUTO: 91 FL (ref 78–100)
MONOCYTES # BLD AUTO: 0.6 10E9/L (ref 0–1.3)
MONOCYTES NFR BLD AUTO: 10.3 %
NEUTROPHILS # BLD AUTO: 3.4 10E9/L (ref 1.6–8.3)
NEUTROPHILS NFR BLD AUTO: 59.8 %
PLATELET # BLD AUTO: 227 10E9/L (ref 150–450)
RBC # BLD AUTO: 4.19 10E12/L (ref 4.4–5.9)
WBC # BLD AUTO: 5.6 10E9/L (ref 4–11)

## 2019-11-20 PROCEDURE — 85025 COMPLETE CBC W/AUTO DIFF WBC: CPT | Performed by: INTERNAL MEDICINE

## 2019-11-20 PROCEDURE — 36415 COLL VENOUS BLD VENIPUNCTURE: CPT | Performed by: INTERNAL MEDICINE

## 2019-11-21 NOTE — RESULT ENCOUNTER NOTE
Thanks for coming in Chac[place].  Your hemoglobin is about the same so please see the hematologist as planned.    Hamilton

## 2019-12-12 ENCOUNTER — PRE VISIT (OUTPATIENT)
Dept: ONCOLOGY | Facility: CLINIC | Age: 44
End: 2019-12-12

## 2019-12-12 ENCOUNTER — ONCOLOGY VISIT (OUTPATIENT)
Dept: ONCOLOGY | Facility: CLINIC | Age: 44
End: 2019-12-12
Attending: INTERNAL MEDICINE
Payer: COMMERCIAL

## 2019-12-12 VITALS
WEIGHT: 204.2 LBS | HEART RATE: 59 BPM | TEMPERATURE: 98.1 F | DIASTOLIC BLOOD PRESSURE: 78 MMHG | OXYGEN SATURATION: 100 % | SYSTOLIC BLOOD PRESSURE: 127 MMHG | BODY MASS INDEX: 28.68 KG/M2

## 2019-12-12 DIAGNOSIS — D64.9 LOW HEMOGLOBIN: ICD-10-CM

## 2019-12-12 LAB
BASOPHILS # BLD AUTO: 0 10E9/L (ref 0–0.2)
BASOPHILS NFR BLD AUTO: 0 %
CRP SERPL-MCNC: <2.9 MG/L (ref 0–8)
DIFFERENTIAL METHOD BLD: NORMAL
EOSINOPHIL # BLD AUTO: 0.2 10E9/L (ref 0–0.7)
EOSINOPHIL NFR BLD AUTO: 3.5 %
ERYTHROCYTE [DISTWIDTH] IN BLOOD BY AUTOMATED COUNT: 11.7 % (ref 10–15)
HAPTOGLOB SERPL-MCNC: 99 MG/DL (ref 15–200)
HCT VFR BLD AUTO: 40.1 % (ref 40–53)
HGB BLD-MCNC: 13.6 G/DL (ref 13.3–17.7)
LYMPHOCYTES # BLD AUTO: 1.3 10E9/L (ref 0.8–5.3)
LYMPHOCYTES NFR BLD AUTO: 24.6 %
MCH RBC QN AUTO: 30.4 PG (ref 26.5–33)
MCHC RBC AUTO-ENTMCNC: 33.9 G/DL (ref 31.5–36.5)
MCV RBC AUTO: 90 FL (ref 78–100)
MONOCYTES # BLD AUTO: 0.7 10E9/L (ref 0–1.3)
MONOCYTES NFR BLD AUTO: 13.2 %
NEUTROPHILS # BLD AUTO: 3.2 10E9/L (ref 1.6–8.3)
NEUTROPHILS NFR BLD AUTO: 58.7 %
PLATELET # BLD AUTO: 214 10E9/L (ref 150–450)
PLATELET # BLD EST: NORMAL 10*3/UL
RBC # BLD AUTO: 4.47 10E12/L (ref 4.4–5.9)
RBC MORPH BLD: NORMAL
TSH SERPL DL<=0.005 MIU/L-ACNC: 1.52 MU/L (ref 0.4–4)
WBC # BLD AUTO: 5.4 10E9/L (ref 4–11)

## 2019-12-12 PROCEDURE — 85025 COMPLETE CBC W/AUTO DIFF WBC: CPT | Performed by: INTERNAL MEDICINE

## 2019-12-12 PROCEDURE — 86140 C-REACTIVE PROTEIN: CPT | Performed by: INTERNAL MEDICINE

## 2019-12-12 PROCEDURE — 86039 ANTINUCLEAR ANTIBODIES (ANA): CPT | Performed by: INTERNAL MEDICINE

## 2019-12-12 PROCEDURE — 99204 OFFICE O/P NEW MOD 45 MIN: CPT | Mod: GC | Performed by: INTERNAL MEDICINE

## 2019-12-12 PROCEDURE — 84403 ASSAY OF TOTAL TESTOSTERONE: CPT | Performed by: INTERNAL MEDICINE

## 2019-12-12 PROCEDURE — 36415 COLL VENOUS BLD VENIPUNCTURE: CPT

## 2019-12-12 PROCEDURE — 84270 ASSAY OF SEX HORMONE GLOBUL: CPT | Performed by: INTERNAL MEDICINE

## 2019-12-12 PROCEDURE — 86038 ANTINUCLEAR ANTIBODIES: CPT | Performed by: INTERNAL MEDICINE

## 2019-12-12 PROCEDURE — 84443 ASSAY THYROID STIM HORMONE: CPT | Performed by: INTERNAL MEDICINE

## 2019-12-12 PROCEDURE — 83010 ASSAY OF HAPTOGLOBIN QUANT: CPT | Performed by: INTERNAL MEDICINE

## 2019-12-12 PROCEDURE — G0463 HOSPITAL OUTPT CLINIC VISIT: HCPCS | Mod: ZF

## 2019-12-12 ASSESSMENT — PAIN SCALES - GENERAL: PAINLEVEL: NO PAIN (0)

## 2019-12-12 NOTE — PROGRESS NOTES
Chief Complaint   Patient presents with     Blood Draw     Blood drawn by LPN.     KRISTYN Peoples LPN

## 2019-12-12 NOTE — PROGRESS NOTES
Hematology New Patient Visit    Nathan Willis MRN# [id number]   Age: 44 year old YOB: 1975   Dec 12, 2019    Reason for consult: Mild anemia    HPI / COURSE  Nathan Willis is a 44 year old yo male with PMH of STEMI s/p RAY to RCA, psoriasis, and depression, who was referered by PCP for evaluation of mild normocytic anemia. He had an acute MI on 5/31/19 and had one RAY placed to RCA. At the time of hospitalization, his hemoglobin was normal 14.7. He had a regular check with his PCP and was found to have slightly low Hgb of 13.0 on 9/6/19. Repeat hgb in 2 weeks was 12.8. His MCV, WBC, and plt have been normal. His iron studies, B12 and folate were all normal. Peripheral smear and retic were unremarkable. Of note, his hgb was normal 13.7 in 1/2015. He is taking aspirin and Brilinta but denies any bleeding. He occasionally has a little bruising. He denies bloody or dark stool, blood in the urine or dark urine. He takes a probiotic but no other supplements. He has been trying to lose some weight after the heart attack. He states his energy level has been good. His psoriasis is mild and under control. He denies any joint or muscle pain. He denies acid reflux. He denies excessive stress from work. He denies diarrhea, fever, chills, shortness of breath or chest pain.    ROS: Complete review of systems was negative or non-contributory with the exception of that noted above.    PERTINENT PAST MEDICAL HISTORY  Past Medical History:   Diagnosis Date     Anxiety and depression 2004    Dr. Nicole Soler     ASCVD (arteriosclerotic cardiovascular disease) 06/2019    stemi, emergent angio done with 90% mid rca very large vessel, ptca and stented, 2nd marginal 30%, ostial 30%, mid lad 50% sequential lesions, nl ef, possible outflow obstruction on echo     Psoriasis managed with a cream intermittently    Past Surgical History:   Procedure Laterality Date     CV CORONARY ANGIOGRAM N/A 5/31/2019    Procedure: Coronary  Angiogram;  Surgeon: Baltazar Gómez MD;  Location:  [facility]     CV LEFT HEART CATH N/A 5/31/2019    Procedure: Left Heart Cath;  Surgeon: Baltazar Gómez MD;  Location:  [facility]     CV LEFT VENTRICULOGRAM N/A 5/31/2019    Procedure: Left Ventriculogram;  Surgeon: Baltazar Gómez MD;  Location:  [facility]     CV PCI STENT DRUG ELUTING N/A 5/31/2019    Procedure: PCI Stent Drug Eluting;  Surgeon: Baltazar Gómez MD;  Location:  [facility]ATH [facility]     wisdom teeth removed  college       SOCIAL / FAMILY HISTORY  Social History     Socioeconomic History     Marital status:      Spouse name: None     Number of children: 3/     Years of education: None     Highest education level: None   Occupational History     Occupation:    Social Needs     Financial resource strain: None     Food insecurity:     Worry: None     Inability: None     Transportation needs:     Medical: None     Non-medical: None   Tobacco Use     Smoking status: Never Smoker     Smokeless tobacco: Never Used   Substance and Sexual Activity     Alcohol use: Yes     Comment: occasional     Drug use: No     Sexual activity: Yes   Lifestyle     Physical activity:     Days per week: None     Minutes per session: None     Stress: None   Relationships     Social connections:     Talks on phone: None     Gets together: None     Attends Baptism service: None     Active member of club or organization: None     Attends meetings of clubs or organizations: None     Relationship status: None     Intimate partner violence:     Fear of current or ex partner: None     Emotionally abused: None     Physically abused: None     Forced sexual activity: None   Other Topics Concern     None   Social History Narrative     None     Never smoker  Social drinking  Denies illicit drug use    Lives with wife and children. Works as a       Family History   Problem Relation Age of Onset      Hypertension Father      Father: Tongue cancer,  at 44. He was not a smoker or heavy drinker  Mother: Brain cancer,  at 46  Brother: HTN    1 daughter  2 sons doing well    MEDICATIONS  Current Outpatient Rx   Medication Sig Dispense Refill     aspirin (ASA) 81 MG EC tablet Take 1 tablet (81 mg) by mouth daily       carvedilol (COREG) 12.5 MG tablet Take 1.5 tablets (18.75 mg) by mouth 2 times daily 180 tablet 3     escitalopram (LEXAPRO) 20 MG tablet Take 1 tablet (20 mg) by mouth daily       lisinopril (PRINIVIL/ZESTRIL) 20 MG tablet Take 1 tablet (20 mg) by mouth 2 times daily 180 tablet 3     nitroGLYcerin (NITROSTAT) 0.4 MG sublingual tablet For chest pain place 1 tablet under the tongue every 5 minutes for 3 doses. If symptoms persist 5 minutes after 1st dose call 911. 25 tablet 0     rosuvastatin (CRESTOR) 40 MG tablet Take 1 tablet (40 mg) by mouth daily 90 tablet 3     ticagrelor (BRILINTA) 90 MG tablet Take 1 tablet (90 mg) by mouth every 12 hours 180 tablet 3     ALLERGIES  No Known Allergies    PHYSICAL EXAMINATION:  /78   Pulse 59   Temp 98.1  F (36.7  C) (Oral)   Wt 92.6 kg (204 lb 3.2 oz)   SpO2 100%   BMI 28.68 kg/m     Constitutional: Awake and alert, appears well-developed, not in acute distress.  Eyes: No scleral icterus. Eyes exhibit no discharge.  ENT/Mouth: Oral mucosa pink and moist  Cardiovascular: Normal rate, regular rhythm, S1, S2. No murmur or rub. No LE edema.  Respiratory: No respiratory distress. Clear to auscultation bilaterally. No wheezes.  Gastrointestinal: Soft. No distension. No tenderness or garding.  Neurological: AAOX3, grossly non-focal  Psychiatric: Mentation and affect appear normal.  Skin: Skin is warm, not diaphoretic.  Hematologic/Lymphatic/Immunologic: No overt bleeding. No lymphadenopathy    DATA:  CBC  Recent Labs   Lab Test 19  1533 10/18/19  1119 19  1344 19  1509  19  1735   WBC 5.6 5.2 4.8 5.7   < > 6.4   HGB 12.9* 12.5*  12.8* 13.0*   < > 14.7   HCT 38.0* 36.3* 37.1* 37.4*   < > 41.3    204 212 221   < > 254   MCV 91 92 92 90   < > 85   RBC 4.19* 3.96* 4.04* 4.16*   < > 4.84   ANEU 3.4 2.9 2.9  --   --  3.5    < > = values in this interval not displayed.       BMP  Recent Labs   Lab Test 09/06/19  1509 06/02/19  0828 06/01/19  0526 05/31/19  1735    138 140 140   POTASSIUM 4.3 4.1 3.7 4.0   CHLORIDE 107 106 109 107   CO2 25 26 26 30   BUN 19 14 12 15   CR 1.05 0.92 0.82 0.94   NICOLE 9.5 8.9 8.4* 8.6       LFT  Recent Labs   Lab Test 05/31/19  1735 01/13/15  0857   PROTTOTAL 8.0 7.8   ALBUMIN 4.2 3.9   AST 33 18   ALT 61 30   BILITOTAL 0.8 0.5   ALKPHOS 89 87     Results for JOLIE WILLIS (MRN [id number]) as of 12/12/2019 07:20   Ref. Range 10/18/2019 11:19 10/18/2019 11:20   Folate Latest Ref Range: >5.4 ng/mL  16.0   Iron Latest Ref Range: 35 - 180 ug/dL 81    Iron Binding Cap Latest Ref Range: 240 - 430 ug/dL 290    Iron Saturation Index Latest Ref Range: 15 - 46 % 28      Ferritin 239    Peripheral smear 10/18/19:  FINAL DIAGNOSIS:   Peripheral blood: Examination:   - Mild normocytic normochromic anemia, see comment     COMMENT:   Causes for normochromic normocytic anemia typically include chronic infectious/inflammatory conditions, hypersplenomegaly, renal disease, medication reactions, nutritional deficiencies, and acute hemorrhage.      Assessment and plan  44 year old yo male with PMH of STEMI s/p RAY to RCA, psoriasis, and depression, who presented with mild anemia.    1. Mild normocytic anemia.  The etiology of Mr. Willis's anemia is not entirely clear. There is no evidence of bleeding or hemolysis. Nutritional deficiency is unlikely as his iron studies, B12, and iron were unremarkable. He is not taking herbals or supplement other than probiotic. He is on dual antiplatelet therapy but denies bleeding. We reviewed his medication list; none of his current meds is a common cause of anemia. His WBC and platelet were  normal; there is no evidence of an underlying bone marrow disorder. He does have psoriasis. His mild anemias could possibly be due to chronic inflammation related to psoriasis or stress from recent STEMI? Will check a CRP. Will check haptoglobin, TSH and testosterone level. Recommend a stool occult blood test to r/o GI bleeding. If none of these is unrevealing, would repeat CBC in 6 months with CBC and monitor.    Staffed w/ Dr. Joanne Carbone MD, PhD  Hem/Onc Fellow    Attending Note:  I have reviewed the patient chart, and interviewed and examined the patient.  I agree with the assessment and plan. The hemoglobin is now normal. The free testosterone is slightly low, not enough to warrant replacement. Testosterone is why men have higher hemoglobin than women. The mildly elevated BELL is of no clinical consequence. No follow-up is needed in hematology clinic.   Yvette Rubio MD  Hematology      Labs:  Results for JOLIE RIBEIRO (MRN [id number]) as of 12/17/2019 16:25   Ref. Range 12/12/2019 08:13   CRP Inflammation Latest Ref Range: 0.0 - 8.0 mg/L <2.9   Free Testosterone Calculated Latest Ref Range: 4.7 - 24.4 ng/dL 4.51 (L)   Testosterone Total Latest Ref Range: 240 - 950 ng/dL 305   TSH Latest Ref Range: 0.40 - 4.00 mU/L 1.52   WBC Latest Ref Range: 4.0 - 11.0 10e9/L 5.4   Hemoglobin Latest Ref Range: 13.3 - 17.7 g/dL 13.6   Hematocrit Latest Ref Range: 40.0 - 53.0 % 40.1   Platelet Count Latest Ref Range: 150 - 450 10e9/L 214   RBC Count Latest Ref Range: 4.4 - 5.9 10e12/L 4.47   MCV Latest Ref Range: 78 - 100 fl 90   MCH Latest Ref Range: 26.5 - 33.0 pg 30.4   MCHC Latest Ref Range: 31.5 - 36.5 g/dL 33.9   RDW Latest Ref Range: 10.0 - 15.0 % 11.7   Diff Method Unknown Manual Differential   % Neutrophils Latest Units: % 58.7   % Lymphocytes Latest Units: % 24.6   % Monocytes Latest Units: % 13.2   % Eosinophils Latest Units: % 3.5   % Basophils Latest Units: % 0.0   Absolute Neutrophil Latest Ref Range: 1.6  - 8.3 10e9/L 3.2   Absolute Lymphocytes Latest Ref Range: 0.8 - 5.3 10e9/L 1.3   Absolute Monocytes Latest Ref Range: 0.0 - 1.3 10e9/L 0.7   Absolute Eosinophils Latest Ref Range: 0.0 - 0.7 10e9/L 0.2   Absolute Basophils Latest Ref Range: 0.0 - 0.2 10e9/L 0.0   RBC Morphology Unknown Normal   Platelet Estimate Unknown Confirming automated cell count   ANTI NUCLEAR RHYS IGG BY IFA WITH REFLEX Unknown Rpt (A)   Haptoglobin Latest Ref Range: 15 - 200 mg/dL 99   Sex Hormone Binding Globulin Latest Ref Range: 11 - 80 nmol/L 51     12/12/19  8:13 AM [id number]    Component Value Flag Ref Range Units Status Collected Lab   BELL interpretation Positive  Abnormal   NEG^Negative  Final 12/12/2019  8:13 AM 51   Comment:                                      Reference range:   <1:40  NEGATIVE   1:40 - 1:80  BORDERLINE POSITIVE   >1:80 POSITIVE    BELL pattern 1     Final 12/12/2019  8:13 AM 51   DENSE FINE SPECKLED    BELL titer 1 1:640

## 2019-12-12 NOTE — NURSING NOTE
"Oncology Rooming Note    December 12, 2019 7:11 AM   Nathan Willis is a 44 year old male who presents for:    Chief Complaint   Patient presents with     New Patient     New patient; AEH low hemoglobin; vitals taken     Initial Vitals: /78   Pulse 59   Temp 98.1  F (36.7  C) (Oral)   Wt 92.6 kg (204 lb 3.2 oz)   SpO2 100%   BMI 28.68 kg/m   Estimated body mass index is 28.68 kg/m  as calculated from the following:    Height as of 9/6/19: 1.797 m (5' 10.75\").    Weight as of this encounter: 92.6 kg (204 lb 3.2 oz). Body surface area is 2.15 meters squared.  No Pain (0) Comment: Data Unavailable   No LMP for male patient.  Allergies reviewed: Yes  Medications reviewed: Yes    Medications: Medication refills not needed today.  Pharmacy name entered into [employer]: [id number] [place] [address]    Clinical concerns: No new concerns today. Dr. Rubio was notified.      LOREN MORALES, [facility]            "

## 2019-12-12 NOTE — LETTER
12/12/2019       RE: Nathan Willis  [address] [place] [address]     Dear Colleague,    Thank you for referring your patient, Nathan Willis, to the [facility]. Please see a copy of my visit note below.    Hematology New Patient Visit    Nathan Willis MRN# [id number]   Age: 44 year old YOB: 1975   Dec 12, 2019    Reason for consult: Mild anemia    HPI / COURSE  Nathan Willis is a 44 year old yo male with PMH of STEMI s/p RAY to RCA, psoriasis, and depression, who was referered by PCP for evaluation of mild normocytic anemia. He had an acute MI on 5/31/19 and had one RAY placed to RCA. At the time of hospitalization, his hemoglobin was normal 14.7. He had a regular check with his PCP and was found to have slightly low Hgb of 13.0 on 9/6/19. Repeat hgb in 2 weeks was 12.8. His MCV, WBC, and plt have been normal. His iron studies, B12 and folate were all normal. Peripheral smear and retic were unremarkable. Of note, his hgb was normal 13.7 in 1/2015. He is taking aspirin and Brilinta but denies any bleeding. He occasionally has a little bruising. He denies bloody or dark stool, blood in the urine or dark urine. He takes a probiotic but no other supplements. He has been trying to lose some weight after the heart attack. He states his energy level has been good. His psoriasis is mild and under control. He denies any joint or muscle pain. He denies acid reflux. He denies excessive stress from work. He denies diarrhea, fever, chills, shortness of breath or chest pain.    ROS: Complete review of systems was negative or non-contributory with the exception of that noted above.    PERTINENT PAST MEDICAL HISTORY  Past Medical History:   Diagnosis Date     Anxiety and depression 2004    Dr. Nicole Soler     ASCVD (arteriosclerotic cardiovascular disease) 06/2019    stemi, emergent angio done with 90% mid rca very large vessel, ptca and stented, 2nd marginal 30%, ostial 30%, mid lad  50% sequential lesions, nl ef, possible outflow obstruction on echo     Psoriasis managed with a cream intermittently    Past Surgical History:   Procedure Laterality Date     CV CORONARY ANGIOGRAM N/A 5/31/2019    Procedure: Coronary Angiogram;  Surgeon: Baltazar Gómez MD;  Location:  [facility]ARD[facility]ATH LAB     CV LEFT HEART CATH N/A 5/31/2019    Procedure: Left Heart Cath;  Surgeon: Baltazar Gómez MD;  Location:  [facility]     CV LEFT VENTRICULOGRAM N/A 5/31/2019    Procedure: Left Ventriculogram;  Surgeon: Baltazar Gómez MD;  Location:  HE[facility]ARD[facility] C[facility] LAB     CV PCI STENT DRUG ELUTING N/A 5/31/2019    Procedure: PCI Stent Drug Eluting;  Surgeon: Baltazar Gómez MD;  Location:  HEART CARDI[facility] CATH LAB     wisdom teeth removed  college       SOCIAL / FAMILY HISTORY  Social History     Socioeconomic History     Marital status:      Spouse name: None     Number of children: 3/     Years of education: None     Highest education level: None   Occupational History     Occupation:    Social Needs     Financial resource strain: None     Food insecurity:     Worry: None     Inability: None     Transportation needs:     Medical: None     Non-medical: None   Tobacco Use     Smoking status: Never Smoker     Smokeless tobacco: Never Used   Substance and Sexual Activity     Alcohol use: Yes     Comment: occasional     Drug use: No     Sexual activity: Yes   Lifestyle     Physical activity:     Days per week: None     Minutes per session: None     Stress: None   Relationships     Social connections:     Talks on phone: None     Gets together: None     Attends Faith service: None     Active member of club or organization: None     Attends meetings of clubs or organizations: None     Relationship status: None     Intimate partner violence:     Fear of current or ex partner: None     Emotionally abused: None     Physically abused: None     Forced sexual activity:  None   Other Topics Concern     None   Social History Narrative     None     Never smoker  Social drinking  Denies illicit drug use    Lives with wife and children. Works as a       Family History   Problem Relation Age of Onset     Hypertension Father      Father: Tongue cancer,  at 44. He was not a smoker or heavy drinker  Mother: Brain cancer,  at 46  Brother: HTN    1 daughter  2 sons doing well    MEDICATIONS  Current Outpatient Rx   Medication Sig Dispense Refill     aspirin (ASA) 81 MG EC tablet Take 1 tablet (81 mg) by mouth daily       carvedilol (COREG) 12.5 MG tablet Take 1.5 tablets (18.75 mg) by mouth 2 times daily 180 tablet 3     escitalopram (LEXAPRO) 20 MG tablet Take 1 tablet (20 mg) by mouth daily       lisinopril (PRINIVIL/ZESTRIL) 20 MG tablet Take 1 tablet (20 mg) by mouth 2 times daily 180 tablet 3     nitroGLYcerin (NITROSTAT) 0.4 MG sublingual tablet For chest pain place 1 tablet under the tongue every 5 minutes for 3 doses. If symptoms persist 5 minutes after 1st dose call 911. 25 tablet 0     rosuvastatin (CRESTOR) 40 MG tablet Take 1 tablet (40 mg) by mouth daily 90 tablet 3     ticagrelor (BRILINTA) 90 MG tablet Take 1 tablet (90 mg) by mouth every 12 hours 180 tablet 3     ALLERGIES  No Known Allergies    PHYSICAL EXAMINATION:  /78   Pulse 59   Temp 98.1  F (36.7  C) (Oral)   Wt 92.6 kg (204 lb 3.2 oz)   SpO2 100%   BMI 28.68 kg/m     Constitutional: Awake and alert, appears well-developed, not in acute distress.  Eyes: No scleral icterus. Eyes exhibit no discharge.  ENT/Mouth: Oral mucosa pink and moist  Cardiovascular: Normal rate, regular rhythm, S1, S2. No murmur or rub. No LE edema.  Respiratory: No respiratory distress. Clear to auscultation bilaterally. No wheezes.  Gastrointestinal: Soft. No distension. No tenderness or garding.  Neurological: AAOX3, grossly non-focal  Psychiatric: Mentation and affect appear normal.  Skin: Skin is warm, not  diaphoretic.  Hematologic/Lymphatic/Immunologic: No overt bleeding. No lymphadenopathy    DATA:  CBC  Recent Labs   Lab Test 11/20/19  1533 10/18/19  1119 09/20/19  1344 09/06/19  1509  05/31/19  1735   WBC 5.6 5.2 4.8 5.7   < > 6.4   HGB 12.9* 12.5* 12.8* 13.0*   < > 14.7   HCT 38.0* 36.3* 37.1* 37.4*   < > 41.3    204 212 221   < > 254   MCV 91 92 92 90   < > 85   RBC 4.19* 3.96* 4.04* 4.16*   < > 4.84   ANEU 3.4 2.9 2.9  --   --  3.5    < > = values in this interval not displayed.       BMP  Recent Labs   Lab Test 09/06/19  1509 06/02/19  0828 06/01/19  0526 05/31/19  1735    138 140 140   POTASSIUM 4.3 4.1 3.7 4.0   CHLORIDE 107 106 109 107   CO2 25 26 26 30   BUN 19 14 12 15   CR 1.05 0.92 0.82 0.94   NICOLE 9.5 8.9 8.4* 8.6       LFT  Recent Labs   Lab Test 05/31/19  1735 01/13/15  0857   PROTTOTAL 8.0 7.8   ALBUMIN 4.2 3.9   AST 33 18   ALT 61 30   BILITOTAL 0.8 0.5   ALKPHOS 89 87     Results for QUINTIN JOLIE (MRN [id number]) as of 12/12/2019 07:20   Ref. Range 10/18/2019 11:19 10/18/2019 11:20   Folate Latest Ref Range: >5.4 ng/mL  16.0   Iron Latest Ref Range: 35 - 180 ug/dL 81    Iron Binding Cap Latest Ref Range: 240 - 430 ug/dL 290    Iron Saturation Index Latest Ref Range: 15 - 46 % 28      Ferritin 239    Peripheral smear 10/18/19:  FINAL DIAGNOSIS:   Peripheral blood: Examination:   - Mild normocytic normochromic anemia, see comment     COMMENT:   Causes for normochromic normocytic anemia typically include chronic infectious/inflammatory conditions, hypersplenomegaly, renal disease, medication reactions, nutritional deficiencies, and acute hemorrhage.      Assessment and plan  44 year old yo male with PMH of STEMI s/p RAY to RCA, psoriasis, and depression, who presented with mild anemia.    1. Mild normocytic anemia.  The etiology of Mr. Willis's anemia is not entirely clear. There is no evidence of bleeding or hemolysis. Nutritional deficiency is unlikely as his iron studies, B12, and iron  were unremarkable. He is not taking herbals or supplement other than probiotic. He is on dual antiplatelet therapy but denies bleeding. We reviewed his medication list; none of his current meds is a common cause of anemia. His WBC and platelet were normal; there is no evidence of an underlying bone marrow disorder. He does have psoriasis. His mild anemias could possibly be due to chronic inflammation related to psoriasis or stress from recent STEMI? Will check a CRP. Will check haptoglobin, TSH and testosterone level. Recommend a stool occult blood test to r/o GI bleeding. If none of these is unrevealing, would repeat CBC in 6 months with CBC and monitor.    Staffed w/ . Joanne Carbone MD, PhD  Hem/Onc Fellow    Attending Note:  I have reviewed the patient chart, and interviewed and examined the patient.  I agree with the assessment and plan. The hemoglobin is now normal. The free testosterone is slightly low, not enough to warrant replacement. Testosterone is why men have higher hemoglobin than women. The mildly elevated BELL is of no clinical consequence. No follow-up is needed in hematology clinic.   Yvette Rubio MD  Hematology      Labs:  Results for JOLEI RIBEIRO (MRN [id number]) as of 12/17/2019 16:25   Ref. Range 12/12/2019 08:13   CRP Inflammation Latest Ref Range: 0.0 - 8.0 mg/L <2.9   Free Testosterone Calculated Latest Ref Range: 4.7 - 24.4 ng/dL 4.51 (L)   Testosterone Total Latest Ref Range: 240 - 950 ng/dL 305   TSH Latest Ref Range: 0.40 - 4.00 mU/L 1.52   WBC Latest Ref Range: 4.0 - 11.0 10e9/L 5.4   Hemoglobin Latest Ref Range: 13.3 - 17.7 g/dL 13.6   Hematocrit Latest Ref Range: 40.0 - 53.0 % 40.1   Platelet Count Latest Ref Range: 150 - 450 10e9/L 214   RBC Count Latest Ref Range: 4.4 - 5.9 10e12/L 4.47   MCV Latest Ref Range: 78 - 100 fl 90   MCH Latest Ref Range: 26.5 - 33.0 pg 30.4   MCHC Latest Ref Range: 31.5 - 36.5 g/dL 33.9   RDW Latest Ref Range: 10.0 - 15.0 % 11.7   Diff Method  Unknown Manual Differential   % Neutrophils Latest Units: % 58.7   % Lymphocytes Latest Units: % 24.6   % Monocytes Latest Units: % 13.2   % Eosinophils Latest Units: % 3.5   % Basophils Latest Units: % 0.0   Absolute Neutrophil Latest Ref Range: 1.6 - 8.3 10e9/L 3.2   Absolute Lymphocytes Latest Ref Range: 0.8 - 5.3 10e9/L 1.3   Absolute Monocytes Latest Ref Range: 0.0 - 1.3 10e9/L 0.7   Absolute Eosinophils Latest Ref Range: 0.0 - 0.7 10e9/L 0.2   Absolute Basophils Latest Ref Range: 0.0 - 0.2 10e9/L 0.0   RBC Morphology Unknown Normal   Platelet Estimate Unknown Confirming automated cell count   ANTI NUCLEAR RHYS IGG BY IFA WITH REFLEX Unknown Rpt (A)   Haptoglobin Latest Ref Range: 15 - 200 mg/dL 99   Sex Hormone Binding Globulin Latest Ref Range: 11 - 80 nmol/L 51     12/12/19  8:13 AM [id number]    Component Value Flag Ref Range Units Status Collected Lab   BELL interpretation Positive  Abnormal   NEG^Negative  Final 12/12/2019  8:13 AM 51   Comment:                                      Reference range:   <1:40  NEGATIVE   1:40 - 1:80  BORDERLINE POSITIVE   >1:80 POSITIVE    BELL pattern 1     Final 12/12/2019  8:13 AM 51   DENSE FINE SPECKLED    BELL titer 1 1:640              Again, thank you for allowing me to participate in the care of your patient.      Sincerely,    Yvette Rubio MD

## 2019-12-13 LAB
ANA PAT SER IF-IMP: ABNORMAL
ANA SER QL IF: POSITIVE
ANA TITR SER IF: ABNORMAL {TITER}
SHBG SERPL-SCNC: 51 NMOL/L (ref 11–80)
TESTOST FREE SERPL-MCNC: 4.51 NG/DL (ref 4.7–24.4)
TESTOST SERPL-MCNC: 305 NG/DL (ref 240–950)

## 2020-02-16 DIAGNOSIS — I21.11 STEMI INVOLVING RIGHT CORONARY ARTERY (H): ICD-10-CM

## 2020-02-17 NOTE — TELEPHONE ENCOUNTER
"carvedilol (COREG) 12.5 MG tablet    Last Written Prescription Date:  6/6/2019  Last Fill Quantity: 180,  # refills: 3   Last office visit: 9/6/2019 with prescribing provider:  Dr. Edge    Future Office Visit:   Next 5 appointments (look out 90 days)    Mar 06, 2020  3:00 PM CST  Office Visit with Hamilton Edge MD  [facility] ([employer] [address] [id number] [phone]         Requested Prescriptions   Pending Prescriptions Disp Refills     carvedilol (COREG) 12.5 MG tablet [Pharmacy Med Name: CARVEDILOL 12.5 MG TABLET] 180 tablet 3     Sig: TAKE 1.5 TABLETS BY MOUTH TWICE DAILY.       Beta-Blockers Protocol Passed - 2/16/2020  9:29 AM        Passed - Blood pressure under 140/90 in past 12 months     BP Readings from Last 3 Encounters:   12/12/19 127/78   09/06/19 115/74   06/06/19 124/86                 Passed - Patient is age 6 or older        Passed - Recent (12 mo) or future (30 days) visit within the authorizing provider's specialty     Patient has had an office visit with the authorizing provider or a provider within the authorizing providers department within the previous 12 mos or has a future within next 30 days. See \"Patient Info\" tab in inbasket, or \"Choose Columns\" in Meds & Orders section of the refill encounter.              Passed - Medication is active on med list          "

## 2020-02-18 RX ORDER — CARVEDILOL 12.5 MG/1
TABLET ORAL
Qty: 180 TABLET | Refills: 1 | Status: SHIPPED | OUTPATIENT
Start: 2020-02-18 | End: 2020-06-03

## 2020-02-18 NOTE — TELEPHONE ENCOUNTER
Prescription approved per [facility] Refill Protocol.    Wolf SANTO RN

## 2020-03-06 ENCOUNTER — OFFICE VISIT (OUTPATIENT)
Dept: FAMILY MEDICINE | Facility: CLINIC | Age: 45
End: 2020-03-06
Payer: COMMERCIAL

## 2020-03-06 VITALS
OXYGEN SATURATION: 99 % | SYSTOLIC BLOOD PRESSURE: 126 MMHG | BODY MASS INDEX: 27.95 KG/M2 | HEART RATE: 72 BPM | TEMPERATURE: 97 F | WEIGHT: 199 LBS | DIASTOLIC BLOOD PRESSURE: 78 MMHG

## 2020-03-06 DIAGNOSIS — F32.A ANXIETY AND DEPRESSION: ICD-10-CM

## 2020-03-06 DIAGNOSIS — F41.9 ANXIETY AND DEPRESSION: ICD-10-CM

## 2020-03-06 DIAGNOSIS — D64.9 ANEMIA, UNSPECIFIED TYPE: ICD-10-CM

## 2020-03-06 DIAGNOSIS — I25.10 ASCVD (ARTERIOSCLEROTIC CARDIOVASCULAR DISEASE): Primary | ICD-10-CM

## 2020-03-06 PROBLEM — I21.11 STEMI INVOLVING RIGHT CORONARY ARTERY (H): Status: RESOLVED | Noted: 2019-05-31 | Resolved: 2020-03-06

## 2020-03-06 PROCEDURE — 99213 OFFICE O/P EST LOW 20 MIN: CPT | Performed by: INTERNAL MEDICINE

## 2020-03-06 NOTE — PROGRESS NOTES
The patient presents for follow-up to his coronary artery disease.  As noted, he had arm discomfort and chest pressure in June and was hospitalized and found to have very artery disease which was stented.  He has been doing super since then.  He works out very regularly, his diet is very good and his weight is down.  He has absolutely no cardiovascular complaints on review of systems.    His anxiety and depression is doing quite well.    Review of systems is otherwise negative.    He did have the anemia as noted in the fall.  Further evaluation was done and ultimately he saw hematology but at that visit his hemoglobin was normal and no follow-up was recommended    Past Medical History:   Diagnosis Date     Anemia 2019     Anxiety and depression 2004    Dr. Nicole Soler     ASCVD (arteriosclerotic cardiovascular disease) 06/2019    stemi, emergent angio done with 90% mid rca very large vessel, ptca and stented, 2nd marginal 30%, ostial 30%, mid lad 50% sequential lesions, nl ef, possible outflow obstruction on echo     Past Surgical History:   Procedure Laterality Date     CV CORONARY ANGIOGRAM N/A 5/31/2019    Procedure: Coronary Angiogram;  Surgeon: Baltazar Gómez MD;  Location:  HEART CARDIAC CATH LAB     CV LEFT HEART CATH N/A 5/31/2019    Procedure: Left Heart Cath;  Surgeon: Baltazar Gómez MD;  Location:  HEART CARDIAC CATH LAB     CV LEFT VENTRICULOGRAM N/A 5/31/2019    Procedure: Left Ventriculogram;  Surgeon: Baltazar Gómez MD;  Location: [facility] CARDIAC CATH LAB     CV PCI STENT DRUG ELUTING N/A 5/31/2019    Procedure: PCI Stent Drug Eluting;  Surgeon: Baltazar Gómez MD;  Location:  HEART CARDIAC CATH LAB     wisdom teeth removed  college     Social History     Socioeconomic History     Marital status:      Spouse name: Not on file     Number of children: 3     Years of education: Not on file     Highest education level: Not on file   Occupational History      Occupation:    Social Needs     Financial resource strain: Not on file     Food insecurity:     Worry: Not on file     Inability: Not on file     Transportation needs:     Medical: Not on file     Non-medical: Not on file   Tobacco Use     Smoking status: Never Smoker     Smokeless tobacco: Never Used   Substance and Sexual Activity     Alcohol use: Yes     Comment: occasional     Drug use: No     Sexual activity: Yes   Lifestyle     Physical activity:     Days per week: Not on file     Minutes per session: Not on file     Stress: Not on file   Relationships     Social connections:     Talks on phone: Not on file     Gets together: Not on file     Attends Congregation service: Not on file     Active member of club or organization: Not on file     Attends meetings of clubs or organizations: Not on file     Relationship status: Not on file     Intimate partner violence:     Fear of current or ex partner: Not on file     Emotionally abused: Not on file     Physically abused: Not on file     Forced sexual activity: Not on file   Other Topics Concern     Not on file   Social History Narrative     Not on file     Current Outpatient Medications   Medication Sig Dispense Refill     aspirin (ASA) 81 MG EC tablet Take 1 tablet (81 mg) by mouth daily       carvedilol (COREG) 12.5 MG tablet TAKE 1.5 TABLETS BY MOUTH TWICE DAILY. 180 tablet 1     escitalopram (LEXAPRO) 20 MG tablet Take 1 tablet (20 mg) by mouth daily       lisinopril (PRINIVIL/ZESTRIL) 20 MG tablet Take 1 tablet (20 mg) by mouth 2 times daily 180 tablet 3     nitroGLYcerin (NITROSTAT) 0.4 MG sublingual tablet For chest pain place 1 tablet under the tongue every 5 minutes for 3 doses. If symptoms persist 5 minutes after 1st dose call 911. 25 tablet 0     rosuvastatin (CRESTOR) 40 MG tablet Take 1 tablet (40 mg) by mouth daily 90 tablet 3     ticagrelor (BRILINTA) 90 MG tablet Take 1 tablet (90 mg) by mouth every 12 hours 180 tablet 3     No Known  Allergies  FAMILY HISTORY NOTED AND REVIEWED    REVIEW OF SYSTEMS: above    PHYSICAL EXAM    /78 (BP Location: Left arm, Patient Position: Chair, Cuff Size: Adult Large)   Pulse 72   Temp 97  F (36.1  C) (Oral)   Wt 90.3 kg (199 lb)   SpO2 99%   BMI 27.95 kg/m      Patient appears non toxic  Mouth - tongue midline and within normal limits, mucous membranes and posterior pharynx within normal limits, no lesions seen.  Neck - no masses, lesions or tenderness  Nodes - no supraclavicular, cervical or axially adenopathy .  Lungs - clear, normal flow  Cardiovascular - regular rate and rhythm, no murmer, rub or gallop, no jvp or edema, carotids within normal limits, no bruits.  Abdomen - normal active bowel sounds, soft, non tender, no masses, guarding or rebound, no hepatosplenomegaly      Labs none    ASSESSMENT:  1. Cad, stable, med mgmt  2. Anemia, resolved  3. Anxiety and depression, no issues    PLAN:  Exercise, diet  Follow up 6 months, labs then  Seeing cards at 1 year [facility]  Up to date immunizations       Hamilton Edge M.D.

## 2020-03-10 ENCOUNTER — HEALTH MAINTENANCE LETTER (OUTPATIENT)
Age: 45
End: 2020-03-10

## 2020-03-15 ENCOUNTER — MYC MEDICAL ADVICE (OUTPATIENT)
Dept: CARDIOLOGY | Facility: CLINIC | Age: 45
End: 2020-03-15

## 2020-03-16 NOTE — TELEPHONE ENCOUNTER
Taye Pittman MD  You 4 minutes ago (9:27 AM)       Let's say as a stock line:     Stress of infection is particularly risky in patients with heart disease disease.  I would take the risk of infection seriously and practice good hygiene as well as avoiding large groups of people.    Routing comment      Responded to patient via MyChart.

## 2020-03-16 NOTE — TELEPHONE ENCOUNTER
Greggt message received from patient:    Dr. Pittman,     I hope you are well and staying sane.   I just wanted to reach out to you to see if you think that given my heart disease, though mild, I should be particularly concerned about COVID-19?  I seem to recall that you said my heart function was normal, but I wanted to get your current thoughts.       Thanks,   Chacorta Willis     Last OV 6/4/19 with Dr. Pittman:    1. Recent inferior STEMI, early revascularization with minimal damage    Status post PCI of the RCA.  Medically managed moderate LAD disease.    Continue maximum dose rosuvastatin as well as beta-blocker and ACE inhibitor.    Continue dual antiplatelet therapy for likely 1 year    Follow-up with me in 1 year    We may consider further imaging including cardiac MRI if symptomatic    Okay for high intensity interval training during cardiac rehabilitation.    Will route to Dr. Pittman for review.

## 2020-06-03 DIAGNOSIS — I21.11 STEMI INVOLVING RIGHT CORONARY ARTERY (H): ICD-10-CM

## 2020-06-03 RX ORDER — LISINOPRIL 20 MG/1
20 TABLET ORAL
Qty: 180 TABLET | Refills: 0 | Status: SHIPPED | OUTPATIENT
Start: 2020-06-03 | End: 2020-09-02

## 2020-06-03 RX ORDER — TICAGRELOR 90 MG/1
TABLET ORAL
Qty: 180 TABLET | Refills: 0 | Status: SHIPPED | OUTPATIENT
Start: 2020-06-03 | End: 2020-09-01

## 2020-06-03 RX ORDER — ROSUVASTATIN CALCIUM 40 MG/1
TABLET, COATED ORAL
Qty: 90 TABLET | Refills: 0 | Status: SHIPPED | OUTPATIENT
Start: 2020-06-03 | End: 2020-09-01

## 2020-06-03 RX ORDER — CARVEDILOL 12.5 MG/1
TABLET ORAL
Qty: 270 TABLET | Refills: 0 | Status: SHIPPED | OUTPATIENT
Start: 2020-06-03 | End: 2020-09-01

## 2020-06-03 NOTE — TELEPHONE ENCOUNTER
Prescription approved per [facility] Refill Protocol. Due for next OV September, per PCP notes.  Thelma Brewer RN on 6/3/2020 at 2:07 PM

## 2020-08-13 ENCOUNTER — VIRTUAL VISIT (OUTPATIENT)
Dept: CARDIOLOGY | Facility: CLINIC | Age: 45
End: 2020-08-13
Payer: COMMERCIAL

## 2020-08-13 DIAGNOSIS — I21.11 STEMI INVOLVING RIGHT CORONARY ARTERY (H): Primary | ICD-10-CM

## 2020-08-13 PROCEDURE — 99213 OFFICE O/P EST LOW 20 MIN: CPT | Mod: 95 | Performed by: INTERNAL MEDICINE

## 2020-08-13 NOTE — LETTER
8/13/2020    Hamilton Edge MD  [address]  [facility] [address]    RE: Nathan Willis       Dear Colleague,    I had the pleasure of seeing Nathan Willis in the [facility].    Nathan Willis is a 44 year old male who is being evaluated via a billable video visit.      Cardiology Progress Note:  Physician location:  office  Video method used: DoxKarmaity bandar    ROS, PMH, PSurgHx, FamHx, SocHx, medication list reviewed in EMR.    Video Exam:  General: No apparent distress. Appears stated age.  Eyes: Normal sclera  Neck: No JVD  Psych: Affect normal, no pressured speech or flights of fancy.  Respiratory: Unlabored, symmetric. Normal rate. No stridor or audible wheezing.  Skin: No facial lesions or bruises.  Musculoskeletal: Symmetric range of movement and coordination bilateral upper extremities, torso and neck.  Abdomen: No guarding  Neurologic: Grossly nonfocal.    The rest of a comprehensive physical examination is deferred due to PHE (public health emergency) video visit restrictions.          Interval History:     The patient is a very pleasant 44 year old whom I have been following for early revascularized inferior STEMI 6/17/20. He feels well and is still on the Brilinta without difficulty. Denies lightheadedness or fatigue.                      Assessment and Plan:         1. History of inferior STEMI 2019, currently asymptomatic    May optionally continue Brilinta to complete 24 months of DAPT.    Continue maximum dose rosuvastatin.    Follow up in one year.    This note was transcribed using electronic voice recognition software and there may be typographical errors present.        Thank you for allowing me to participate in the care of your patient.    Sincerely,     Taye Pittman MD     [facility]   Medication changed.

## 2020-08-13 NOTE — PROGRESS NOTES
"Nathan Willis is a 44 year old male who is being evaluated via a billable video visit.      The patient has been notified of following:     \"This video visit will be conducted via a call between you and your physician/provider. We have found that certain health care needs can be provided without the need for an in-person physical exam.  This service lets us provide the care you need with a video conversation.  If a prescription is necessary we can send it directly to your pharmacy.  If lab work is needed we can place an order for that and you can then stop by our lab to have the test done at a later time.    Video visits are billed at different rates depending on your insurance coverage.  Please reach out to your insurance provider with any questions.    If during the course of the call the physician/provider feels a video visit is not appropriate, you will not be charged for this service.\"    Patient has given verbal consent for Video visit? Yes  How would you like to obtain your AVS? Mail a copy  If you are dropped from the video visit, the video invite should be resent to: Text to cell phone: [phone]  Will anyone else be joining your video visit? No      Review Of Systems  Skin: Negative  Eyes: Positive for glasses  Ears/Nose/Throat: Negative  Respiratory: Negative  Cardiovascular: Negative  Gastrointestinal: Negative  Genitourinary: Negative  Musculoskeletal: Negative  Neurologic: Negative  Psychiatric: Positive for anxiety; depression - under control  Hematologic/Lymphatic/Immunologic: Negative  Endocrine: Negative    Patient reported vitals:  BP: 117/73  Heart rate: 80  Weight: 198 lbs    Melanie Feliz CMA    Video-Visit Details    Type of service:  Video Visit    Video Start Time: 3:19 PM  Video End Time: 3:35 PM    Originating Location (pt. Location): Home    Distant Location (provider location):  [facility]     Platform used for Video Visit: " DoximFanXT    Cardiology Progress Note:  Physician location:  office  Video method used: [employer] bandar    ROS, PMH, PSurgHx, FamHx, SocHx, medication list reviewed in EMR.    Video Exam:  General: No apparent distress. Appears stated age.  Eyes: Normal sclera  Neck: No JVD  Psych: Affect normal, no pressured speech or flights of fancy.  Respiratory: Unlabored, symmetric. Normal rate. No stridor or audible wheezing.  Skin: No facial lesions or bruises.  Musculoskeletal: Symmetric range of movement and coordination bilateral upper extremities, torso and neck.  Abdomen: No guarding  Neurologic: Grossly nonfocal.    The rest of a comprehensive physical examination is deferred due to PHE (public health emergency) video visit restrictions.          Interval History:     The patient is a very pleasant 44 year old whom I have been following for early revascularized inferior STEMI 6/17/20. He feels well and is still on the Brilinta without difficulty. Denies lightheadedness or fatigue.                      Assessment and Plan:         1. History of inferior STEMI 2019, currently asymptomatic    May optionally continue Brilinta to complete 24 months of DAPT.    Continue maximum dose rosuvastatin.    Follow up in one year.    This note was transcribed using electronic voice recognition software and there may be typographical errors present.          Taye Pittman MD

## 2020-09-01 DIAGNOSIS — I21.11 STEMI INVOLVING RIGHT CORONARY ARTERY (H): ICD-10-CM

## 2020-09-02 ENCOUNTER — TELEPHONE (OUTPATIENT)
Dept: FAMILY MEDICINE | Facility: CLINIC | Age: 45
End: 2020-09-02

## 2020-09-02 RX ORDER — LISINOPRIL 20 MG/1
20 TABLET ORAL
Qty: 180 TABLET | Refills: 0 | Status: SHIPPED | OUTPATIENT
Start: 2020-09-02 | End: 2020-09-04

## 2020-09-02 RX ORDER — CARVEDILOL 12.5 MG/1
TABLET ORAL
Qty: 270 TABLET | Refills: 0 | Status: SHIPPED | OUTPATIENT
Start: 2020-09-02 | End: 2020-09-04

## 2020-09-02 RX ORDER — ROSUVASTATIN CALCIUM 40 MG/1
40 TABLET, COATED ORAL DAILY
Qty: 90 TABLET | Refills: 0 | Status: SHIPPED | OUTPATIENT
Start: 2020-09-02 | End: 2020-09-04

## 2020-09-02 NOTE — TELEPHONE ENCOUNTER
Prior Authorization Retail Medication Request    Medication/Dose:  carvedilol (COREG) 12.5 MG tablet   ICD code (if different than what is on RX):  STEMI involving right coronary artery (H) (I21.11)   Previously Tried and Failed:    Rationale:      Insurance Name:  [employer]  Insurance ID: [id number]      Pharmacy Information (if different than what is on RX)  Name:  [facility] [id number] [place] [facility] [address]   Phone:  [phone]

## 2020-09-02 NOTE — TELEPHONE ENCOUNTER
Prescription approved per [facility] Refill Protocol.  Danielle GOODSON RN

## 2020-09-02 NOTE — TELEPHONE ENCOUNTER
Central Prior Authorization Team   Phone: [phone]      PA Initiation    Medication: carvedilol (COREG) 12.5 MG tablet -Initiated  Insurance Company: Other (see comments)Comment:  Ninfa GARCIA [place] [phone]  Ph[id number] [place] [place] [facility] [address]  Filling Pharmacy Phone: [phone]  Filling Pharmacy Fax:    Start Date: 9/2/2020

## 2020-09-02 NOTE — TELEPHONE ENCOUNTER
Central Prior Authorization Team  Phone: [phone]    PA Initiation    Medication: ticagrelor (BRILINTA) 90 MG tablet   Insurance Company: Comment:  Ninfa ph: [phone]  [id number] [place] [facility] [place] [place] - [address]  Filling Pharmacy Phone: [phone]  Filling Pharmacy Fax:    Start Date: 9/2/2020

## 2020-09-02 NOTE — TELEPHONE ENCOUNTER
Prior Authorization Retail Medication Request    Medication/Dose: ticagrelor (BRILINTA) 90 MG tablet   ICD code (if different than what is on RX):  STEMI involving right coronary artery (H) (I21.11)   Previously Tried and Failed:    Rationale:      Insurance Name:  [employer]  Insurance ID: [id number]      Pharmacy Information (if different than what is on RX)  Name:  [facility] [id number] [place] [facility] [address]   Phone:  [phone]

## 2020-09-04 ENCOUNTER — OFFICE VISIT (OUTPATIENT)
Dept: FAMILY MEDICINE | Facility: CLINIC | Age: 45
End: 2020-09-04
Payer: COMMERCIAL

## 2020-09-04 VITALS
BODY MASS INDEX: 29.27 KG/M2 | HEIGHT: 71 IN | TEMPERATURE: 96.9 F | SYSTOLIC BLOOD PRESSURE: 121 MMHG | OXYGEN SATURATION: 99 % | HEART RATE: 54 BPM | DIASTOLIC BLOOD PRESSURE: 78 MMHG | WEIGHT: 209.1 LBS

## 2020-09-04 DIAGNOSIS — F41.9 ANXIETY AND DEPRESSION: ICD-10-CM

## 2020-09-04 DIAGNOSIS — D64.9 ANEMIA, UNSPECIFIED TYPE: Primary | ICD-10-CM

## 2020-09-04 DIAGNOSIS — I21.11 STEMI INVOLVING RIGHT CORONARY ARTERY (H): ICD-10-CM

## 2020-09-04 DIAGNOSIS — F32.A ANXIETY AND DEPRESSION: ICD-10-CM

## 2020-09-04 LAB
ANION GAP SERPL CALCULATED.3IONS-SCNC: 5 MMOL/L (ref 3–14)
BASOPHILS # BLD AUTO: 0 10E9/L (ref 0–0.2)
BASOPHILS NFR BLD AUTO: 0.7 %
BUN SERPL-MCNC: 23 MG/DL (ref 7–30)
CALCIUM SERPL-MCNC: 9.1 MG/DL (ref 8.5–10.1)
CHLORIDE SERPL-SCNC: 107 MMOL/L (ref 94–109)
CHOLEST SERPL-MCNC: 89 MG/DL
CO2 SERPL-SCNC: 26 MMOL/L (ref 20–32)
CREAT SERPL-MCNC: 0.96 MG/DL (ref 0.66–1.25)
DIFFERENTIAL METHOD BLD: ABNORMAL
EOSINOPHIL # BLD AUTO: 0.1 10E9/L (ref 0–0.7)
EOSINOPHIL NFR BLD AUTO: 2.2 %
ERYTHROCYTE [DISTWIDTH] IN BLOOD BY AUTOMATED COUNT: 12.1 % (ref 10–15)
GFR SERPL CREATININE-BSD FRML MDRD: >90 ML/MIN/{1.73_M2}
GLUCOSE SERPL-MCNC: 90 MG/DL (ref 70–99)
HCT VFR BLD AUTO: 39.1 % (ref 40–53)
HDLC SERPL-MCNC: 50 MG/DL
HGB BLD-MCNC: 13.6 G/DL (ref 13.3–17.7)
LDLC SERPL CALC-MCNC: 26 MG/DL
LYMPHOCYTES # BLD AUTO: 1.2 10E9/L (ref 0.8–5.3)
LYMPHOCYTES NFR BLD AUTO: 30.8 %
MCH RBC QN AUTO: 30.8 PG (ref 26.5–33)
MCHC RBC AUTO-ENTMCNC: 34.8 G/DL (ref 31.5–36.5)
MCV RBC AUTO: 89 FL (ref 78–100)
MONOCYTES # BLD AUTO: 0.5 10E9/L (ref 0–1.3)
MONOCYTES NFR BLD AUTO: 12.7 %
NEUTROPHILS # BLD AUTO: 2.2 10E9/L (ref 1.6–8.3)
NEUTROPHILS NFR BLD AUTO: 53.6 %
NONHDLC SERPL-MCNC: 39 MG/DL
PLATELET # BLD AUTO: 187 10E9/L (ref 150–450)
POTASSIUM SERPL-SCNC: 4.3 MMOL/L (ref 3.4–5.3)
RBC # BLD AUTO: 4.41 10E12/L (ref 4.4–5.9)
SODIUM SERPL-SCNC: 138 MMOL/L (ref 133–144)
TRIGL SERPL-MCNC: 64 MG/DL
WBC # BLD AUTO: 4 10E9/L (ref 4–11)

## 2020-09-04 PROCEDURE — 85025 COMPLETE CBC W/AUTO DIFF WBC: CPT | Performed by: INTERNAL MEDICINE

## 2020-09-04 PROCEDURE — 99213 OFFICE O/P EST LOW 20 MIN: CPT | Performed by: INTERNAL MEDICINE

## 2020-09-04 PROCEDURE — 36415 COLL VENOUS BLD VENIPUNCTURE: CPT | Performed by: INTERNAL MEDICINE

## 2020-09-04 PROCEDURE — 80061 LIPID PANEL: CPT | Performed by: INTERNAL MEDICINE

## 2020-09-04 PROCEDURE — 80048 BASIC METABOLIC PNL TOTAL CA: CPT | Performed by: INTERNAL MEDICINE

## 2020-09-04 RX ORDER — ROSUVASTATIN CALCIUM 40 MG/1
40 TABLET, COATED ORAL DAILY
Qty: 90 TABLET | Refills: 3 | Status: SHIPPED | OUTPATIENT
Start: 2020-09-04 | End: 2020-12-21

## 2020-09-04 RX ORDER — LISINOPRIL 20 MG/1
20 TABLET ORAL
Qty: 180 TABLET | Refills: 3 | Status: SHIPPED | OUTPATIENT
Start: 2020-09-04 | End: 2020-12-21

## 2020-09-04 RX ORDER — CARVEDILOL 12.5 MG/1
TABLET ORAL
Qty: 270 TABLET | Refills: 3 | Status: SHIPPED | OUTPATIENT
Start: 2020-09-04 | End: 2020-12-21

## 2020-09-04 ASSESSMENT — PATIENT HEALTH QUESTIONNAIRE - PHQ9: SUM OF ALL RESPONSES TO PHQ QUESTIONS 1-9: 0

## 2020-09-04 ASSESSMENT — MIFFLIN-ST. JEOR: SCORE: 1866.63

## 2020-09-04 NOTE — RESULT ENCOUNTER NOTE
Chacorta,    The blood tests look super.  Your cholesterol is very low which is great.  Your blood salts, kidney tests, and sugar test for diabetes are normal.  Your complete blood count is also normal.    If you have any questions give me a call.    Hamilton

## 2020-09-04 NOTE — PROGRESS NOTES
The patient is doing very well, works out very reg and on meds noted.  No cv c/o on review of systems.  No other c/o on review of systems.    , 3 kids, youngest 1st grade, oldest is 12.  They are doing virtual school due to covid.    Past Medical History:   Diagnosis Date     Anemia 2019    nl iron, folic acid, spep, b12, haptoglobin, seen by heme and back to nl, no fu needed     Anxiety and depression 2004    Dr. Nicole Soler     ASCVD (arteriosclerotic cardiovascular disease) 06/2019    stemi, emergent angio done with 90% mid rca very large vessel, ptca and stented, 2nd marginal 30%, ostial 30%, mid lad 50% sequential lesions, nl ef, possible outflow obstruction on echo     Past Surgical History:   Procedure Laterality Date     CV CORONARY ANGIOGRAM N/A 5/31/2019    Procedure: Coronary Angiogram;  Surgeon: Baltazar Gómez MD;  Location:  HEART CARDIAC CATH LAB     CV LEFT HEART CATH N/A 5/31/2019    Procedure: Left Heart Cath;  Surgeon: Baltazar Gómez MD;  Location:  HEART CARDIAC CATH LAB     CV LEFT VENTRICULOGRAM N/A 5/31/2019    Procedure: Left Ventriculogram;  Surgeon: Baltazar Gómez MD;  Location:  HEART CARDIAC CATH LAB     CV PCI STENT DRUG ELUTING N/A 5/31/2019    Procedure: PCI Stent Drug Eluting;  Surgeon: Baltazar Gómez MD;  Location:  HEART CARDIAC CATH LAB     wisdom teeth removed  college     Social History     Socioeconomic History     Marital status:      Spouse name: Not on file     Number of children: 3     Years of education: Not on file     Highest education level: Not on file   Occupational History     Occupation:    Social Needs     Financial resource strain: Not on file     Food insecurity     Worry: Not on file     Inability: Not on file     Transportation needs     Medical: Not on file     Non-medical: Not on file   Tobacco Use     Smoking status: Never Smoker     Smokeless tobacco: Never Used   Substance and Sexual Activity      "Alcohol use: Yes     Comment: occasional     Drug use: No     Sexual activity: Yes   Lifestyle     Physical activity     Days per week: Not on file     Minutes per session: Not on file     Stress: Not on file   Relationships     Social connections     Talks on phone: Not on file     Gets together: Not on file     Attends Orthodoxy service: Not on file     Active member of club or organization: Not on file     Attends meetings of clubs or organizations: Not on file     Relationship status: Not on file     Intimate partner violence     Fear of current or ex partner: Not on file     Emotionally abused: Not on file     Physically abused: Not on file     Forced sexual activity: Not on file   Other Topics Concern     Not on file   Social History Narrative     Not on file     Current Outpatient Medications   Medication Sig Dispense Refill     aspirin (ASA) 81 MG EC tablet Take 1 tablet (81 mg) by mouth daily       carvedilol (COREG) 12.5 MG tablet TAKE 1 AND 1/2 TABLETS BY MOUTH TWICE DAILY 270 tablet 3     escitalopram (LEXAPRO) 20 MG tablet Take 1 tablet (20 mg) by mouth daily       lisinopril (ZESTRIL) 20 MG tablet Take 1 tablet (20 mg) by mouth 2 times daily 180 tablet 3     nitroGLYcerin (NITROSTAT) 0.4 MG sublingual tablet For chest pain place 1 tablet under the tongue every 5 minutes for 3 doses. If symptoms persist 5 minutes after 1st dose call 911. 25 tablet 0     rosuvastatin (CRESTOR) 40 MG tablet Take 1 tablet (40 mg) by mouth daily 90 tablet 3     ticagrelor (BRILINTA) 90 MG tablet TAKE 1 TABLET (90 MG) BY MOUTH EVERY 12 HOURS 180 tablet 3     No Known Allergies  FAMILY HISTORY NOTED AND REVIEWED    REVIEW OF SYSTEMS: above    PHYSICAL EXAM    /78 (BP Location: Left arm, Patient Position: Sitting, Cuff Size: Adult Regular)   Pulse 54   Temp 96.9  F (36.1  C) (Tympanic)   Ht 1.797 m (5' 10.75\")   Wt 94.8 kg (209 lb 1.6 oz)   SpO2 99%   BMI 29.37 kg/m      Patient appears non toxic  Lungs - clear, " normal flow  Cardiovascular - regular rate and rhythm, no murmer, rub or gallop, no jvp or edema, carotids within normal limits, no bruits.  Abdomen - normal active bowel sounds, soft, non tender, no masses, guarding or rebound, no hepatosplenomegaly    Labs sent    ASSESSMENT:  1. Cad, med mgmt, doing super, call if changes  2. Anemia, doubt significant, follow up labs today  3. Anxiety, doing well    PLAN:  Labs today  Flu shot in 3 weeks  Exercise, diet  Call if changes    Hamilton Edge M.D.        Hamilton Edge M.D.

## 2020-09-08 NOTE — TELEPHONE ENCOUNTER
Per call to insurance, this pa has been cancelled due to a pa was not needed. Plan allows 60 tablets per month of this medication and the pharmacy received a paid claim on 09/02/2020.

## 2020-09-09 NOTE — TELEPHONE ENCOUNTER
Prior Authorization Not Needed per Insurance    Medication: carvedilol (COREG) 12.5 MG tablet -PA NOT NEEDED  Insurance Company: Other (see comments)Comment:  Ninfa [facility] [phone]  Expected CoPay:      [facility] [id number] [place] [address]  Pharmacy Notified: Yes  Patient Notified: No    Called to check on the status of PA and per Lucy at insurance they only cover a 30 day supply at a time.  The pharmacy got a paid claim on 9/2/20 for 90 for 30 days.

## 2020-12-20 ENCOUNTER — MYC MEDICAL ADVICE (OUTPATIENT)
Dept: FAMILY MEDICINE | Facility: CLINIC | Age: 45
End: 2020-12-20

## 2020-12-20 DIAGNOSIS — I21.11 STEMI INVOLVING RIGHT CORONARY ARTERY (H): ICD-10-CM

## 2020-12-21 RX ORDER — LISINOPRIL 20 MG/1
20 TABLET ORAL
Qty: 180 TABLET | Refills: 2 | Status: SHIPPED | OUTPATIENT
Start: 2020-12-21 | End: 2021-08-31

## 2020-12-21 RX ORDER — ROSUVASTATIN CALCIUM 40 MG/1
40 TABLET, COATED ORAL DAILY
Qty: 90 TABLET | Refills: 2 | Status: SHIPPED | OUTPATIENT
Start: 2020-12-21 | End: 2021-09-09

## 2020-12-21 RX ORDER — CARVEDILOL 12.5 MG/1
TABLET ORAL
Qty: 270 TABLET | Refills: 2 | Status: SHIPPED | OUTPATIENT
Start: 2020-12-21 | End: 2021-08-31

## 2020-12-21 NOTE — TELEPHONE ENCOUNTER
The patient called and asked if he can get a new RX for these 3 medications   He has changed Pharmacies to    [place] [id number] [place] [address] [address]  So his refills are no longer any good

## 2021-03-04 ENCOUNTER — VIRTUAL VISIT (OUTPATIENT)
Dept: FAMILY MEDICINE | Facility: CLINIC | Age: 46
End: 2021-03-04
Payer: COMMERCIAL

## 2021-03-04 DIAGNOSIS — F41.9 ANXIETY AND DEPRESSION: ICD-10-CM

## 2021-03-04 DIAGNOSIS — F32.A ANXIETY AND DEPRESSION: ICD-10-CM

## 2021-03-04 DIAGNOSIS — D64.9 ANEMIA, UNSPECIFIED TYPE: ICD-10-CM

## 2021-03-04 DIAGNOSIS — I25.10 ASCVD (ARTERIOSCLEROTIC CARDIOVASCULAR DISEASE): Primary | ICD-10-CM

## 2021-03-04 PROCEDURE — 99213 OFFICE O/P EST LOW 20 MIN: CPT | Mod: 95 | Performed by: INTERNAL MEDICINE

## 2021-03-04 ASSESSMENT — ANXIETY QUESTIONNAIRES
6. BECOMING EASILY ANNOYED OR IRRITABLE: NOT AT ALL
7. FEELING AFRAID AS IF SOMETHING AWFUL MIGHT HAPPEN: NOT AT ALL
3. WORRYING TOO MUCH ABOUT DIFFERENT THINGS: NOT AT ALL
IF YOU CHECKED OFF ANY PROBLEMS ON THIS QUESTIONNAIRE, HOW DIFFICULT HAVE THESE PROBLEMS MADE IT FOR YOU TO DO YOUR WORK, TAKE CARE OF THINGS AT HOME, OR GET ALONG WITH OTHER PEOPLE: NOT DIFFICULT AT ALL
5. BEING SO RESTLESS THAT IT IS HARD TO SIT STILL: NOT AT ALL
GAD7 TOTAL SCORE: 0
2. NOT BEING ABLE TO STOP OR CONTROL WORRYING: NOT AT ALL
1. FEELING NERVOUS, ANXIOUS, OR ON EDGE: NOT AT ALL

## 2021-03-04 ASSESSMENT — PATIENT HEALTH QUESTIONNAIRE - PHQ9
SUM OF ALL RESPONSES TO PHQ QUESTIONS 1-9: 0
5. POOR APPETITE OR OVEREATING: NOT AT ALL

## 2021-03-04 NOTE — PROGRESS NOTES
Chacorta is a 45 year old who is being evaluated via a billable video visit.      How would you like to obtain your AVS? MyChart  If the video visit is dropped, the invitation should be resent by: Text to cell phone: [phone]  Will anyone else be joining your video visit? No    Video Start Time: 7:52 AM      Subjective   Chacorta is a 45 year old who presents for the following health issues    This is a follow up for his cad, anemia, elevated cholesterol.  The patient is doing super and has no complaints.  He works out very regularly at a high level without any symptoms.    He does have the anemia.  Follow-up testing returned to normal.    He is , he moved to [place], he has 3 young kids.    Past Medical History:   Diagnosis Date     Anemia 2019    nl iron, folic acid, spep, b12, haptoglobin, seen by heme and back to nl, no fu needed     Anxiety and depression 2004    Dr. Nicole Soler     ASCVD (arteriosclerotic cardiovascular disease) 06/2019    stemi, emergent angio done with 90% mid rca very large vessel, ptca and stented, 2nd marginal 30%, ostial 30%, mid lad 50% sequential lesions, nl ef, possible outflow obstruction on echo     Past Surgical History:   Procedure Laterality Date     CV CORONARY ANGIOGRAM N/A 5/31/2019    Procedure: Coronary Angiogram;  Surgeon: Baltazar Gómez MD;  Location:  [facility]     CV LEFT HEART CATH N/A 5/31/2019    Procedure: Left Heart Cath;  Surgeon: Baltazar Gómez MD;  Location:  [facility]     CV LEFT VENTRICULOGRAM N/A 5/31/2019    Procedure: Left Ventriculogram;  Surgeon: Baltazar Gómez MD;  Location:  [facility]     CV PCI STENT DRUG ELUTING N/A 5/31/2019    Procedure: PCI Stent Drug Eluting;  Surgeon: Baltazar Gómez MD;  Location:  [facility]     wisdom teeth removed  college     Social History     Socioeconomic History     Marital status:      Spouse name: Not on file     Number of  children: 3     Years of education: Not on file     Highest education level: Not on file   Occupational History     Occupation:    Social Needs     Financial resource strain: Not on file     Food insecurity     Worry: Not on file     Inability: Not on file     Transportation needs     Medical: Not on file     Non-medical: Not on file   Tobacco Use     Smoking status: Never Smoker     Smokeless tobacco: Never Used   Substance and Sexual Activity     Alcohol use: Yes     Comment: occasional     Drug use: No     Sexual activity: Yes   Lifestyle     Physical activity     Days per week: Not on file     Minutes per session: Not on file     Stress: Not on file   Relationships     Social connections     Talks on phone: Not on file     Gets together: Not on file     Attends Mosque service: Not on file     Active member of club or organization: Not on file     Attends meetings of clubs or organizations: Not on file     Relationship status: Not on file     Intimate partner violence     Fear of current or ex partner: Not on file     Emotionally abused: Not on file     Physically abused: Not on file     Forced sexual activity: Not on file   Other Topics Concern     Not on file   Social History Narrative     Not on file     Current Outpatient Medications   Medication Sig Dispense Refill     aspirin (ASA) 81 MG EC tablet Take 1 tablet (81 mg) by mouth daily       carvedilol (COREG) 12.5 MG tablet TAKE 1 AND 1/2 TABLETS BY MOUTH TWICE DAILY 270 tablet 2     escitalopram (LEXAPRO) 20 MG tablet Take 1 tablet (20 mg) by mouth daily       lisinopril (ZESTRIL) 20 MG tablet Take 1 tablet (20 mg) by mouth 2 times daily 180 tablet 2     nitroGLYcerin (NITROSTAT) 0.4 MG sublingual tablet For chest pain place 1 tablet under the tongue every 5 minutes for 3 doses. If symptoms persist 5 minutes after 1st dose call 911. 25 tablet 0     rosuvastatin (CRESTOR) 40 MG tablet Take 1 tablet (40 mg) by mouth daily 90 tablet 2     ticagrelor  (BRILINTA) 90 MG tablet TAKE 1 TABLET (90 MG) BY MOUTH EVERY 12 HOURS 180 tablet 3     No Known Allergies  FAMILY HISTORY NOTED AND REVIEWED    REVIEW OF SYSTEMS: above    PHYSICAL EXAM    There were no vitals taken for this visit.    Patient appears non toxic        Vitals:  No vitals were obtained today due to virtual visit.    Physical Exam   GENERAL: Healthy, alert and no distress  EYES: Eyes grossly normal to inspection.  No discharge or erythema, or obvious scleral/conjunctival abnormalities.  RESP: No audible wheeze, cough, or visible cyanosis.  No visible retractions or increased work of breathing.    SKIN: Visible skin clear. No significant rash, abnormal pigmentation or lesions.  NEURO: Cranial nerves grossly intact.  Mentation and speech appropriate for age.  PSYCH: Mentation appears normal, affect normal/bright, judgement and insight intact, normal speech and appearance well-groomed.    ASSESSMENT:  1. Cad, stable, med mgmt, call if any symptoms  2. Elevated cholesterol on statin  3. Depression, doing fine    PLAN:  Exercise, diet  Follow up for complete physical exam in Sept.        Video-Visit Details    Type of service:  Video Visit    Video End Time:8:05 AM    Originating Location (pt. Location): Home    Distant Location (provider location):  [facility]     Platform used for Video Visit: [employer]

## 2021-03-05 ASSESSMENT — ANXIETY QUESTIONNAIRES: GAD7 TOTAL SCORE: 0

## 2021-04-24 ENCOUNTER — HEALTH MAINTENANCE LETTER (OUTPATIENT)
Age: 46
End: 2021-04-24

## 2021-08-29 DIAGNOSIS — I21.11 STEMI INVOLVING RIGHT CORONARY ARTERY (H): ICD-10-CM

## 2021-08-31 RX ORDER — LISINOPRIL 20 MG/1
TABLET ORAL
Qty: 180 TABLET | Refills: 0 | Status: SHIPPED | OUTPATIENT
Start: 2021-08-31 | End: 2021-09-09

## 2021-08-31 RX ORDER — CARVEDILOL 12.5 MG/1
18.75 TABLET ORAL 2 TIMES DAILY
Qty: 270 TABLET | Refills: 0 | Status: SHIPPED | OUTPATIENT
Start: 2021-08-31 | End: 2021-09-09

## 2021-08-31 NOTE — TELEPHONE ENCOUNTER
Next 5 appointments (look out 90 days)    Sep 09, 2021  7:00 AM  PHYSICAL with Hamilton Edge MD  [facility] ([facility] ) [address], [address]  [facility] [address]  [phone]   Sep 21, 2021  8:45 AM  Return Visit with Taye Pittman MD  [facility] ([facility] ) [address]  [facility] [id number] [phone]        Medication filled 1 time as pt is due for a follow-up in clinic. Patient is already scheduled for upcoming appointment.   Sole SMALLWOOD RN

## 2021-09-06 ASSESSMENT — ENCOUNTER SYMPTOMS
FREQUENCY: 0
CHILLS: 0
JOINT SWELLING: 0
ABDOMINAL PAIN: 0
HEMATURIA: 0
FEVER: 0
HEADACHES: 0
NAUSEA: 0
HEMATOCHEZIA: 0
MYALGIAS: 0
WEAKNESS: 0
DYSURIA: 0
CONSTIPATION: 0
HEARTBURN: 0
PALPITATIONS: 0
NERVOUS/ANXIOUS: 0
DIZZINESS: 0
SORE THROAT: 0
EYE PAIN: 0
SHORTNESS OF BREATH: 0
DIARRHEA: 0
COUGH: 0
ARTHRALGIAS: 0
PARESTHESIAS: 0

## 2021-09-07 ENCOUNTER — MYC MEDICAL ADVICE (OUTPATIENT)
Dept: FAMILY MEDICINE | Facility: CLINIC | Age: 46
End: 2021-09-07

## 2021-09-09 ENCOUNTER — OFFICE VISIT (OUTPATIENT)
Dept: FAMILY MEDICINE | Facility: CLINIC | Age: 46
End: 2021-09-09
Payer: COMMERCIAL

## 2021-09-09 VITALS
SYSTOLIC BLOOD PRESSURE: 134 MMHG | WEIGHT: 223.5 LBS | DIASTOLIC BLOOD PRESSURE: 86 MMHG | HEART RATE: 70 BPM | BODY MASS INDEX: 31.39 KG/M2

## 2021-09-09 DIAGNOSIS — F32.A ANXIETY AND DEPRESSION: ICD-10-CM

## 2021-09-09 DIAGNOSIS — C02.9 TONGUE CANCER (H): ICD-10-CM

## 2021-09-09 DIAGNOSIS — Z00.00 ENCOUNTER FOR ANNUAL PHYSICAL EXAM: Primary | ICD-10-CM

## 2021-09-09 DIAGNOSIS — F41.9 ANXIETY AND DEPRESSION: ICD-10-CM

## 2021-09-09 DIAGNOSIS — D64.9 ANEMIA, UNSPECIFIED TYPE: ICD-10-CM

## 2021-09-09 DIAGNOSIS — I21.11 STEMI INVOLVING RIGHT CORONARY ARTERY (H): ICD-10-CM

## 2021-09-09 DIAGNOSIS — I25.10 ASCVD (ARTERIOSCLEROTIC CARDIOVASCULAR DISEASE): ICD-10-CM

## 2021-09-09 LAB
ALBUMIN SERPL-MCNC: 3.9 G/DL (ref 3.4–5)
ALP SERPL-CCNC: 81 U/L (ref 40–150)
ALT SERPL W P-5'-P-CCNC: 38 U/L (ref 0–70)
ANION GAP SERPL CALCULATED.3IONS-SCNC: 5 MMOL/L (ref 3–14)
AST SERPL W P-5'-P-CCNC: 30 U/L (ref 0–45)
BASOPHILS # BLD AUTO: 0 10E3/UL (ref 0–0.2)
BASOPHILS NFR BLD AUTO: 1 %
BILIRUB SERPL-MCNC: 1.6 MG/DL (ref 0.2–1.3)
BUN SERPL-MCNC: 23 MG/DL (ref 7–30)
CALCIUM SERPL-MCNC: 8.9 MG/DL (ref 8.5–10.1)
CHLORIDE BLD-SCNC: 107 MMOL/L (ref 94–109)
CHOLEST SERPL-MCNC: 114 MG/DL
CO2 SERPL-SCNC: 27 MMOL/L (ref 20–32)
CREAT SERPL-MCNC: 1.11 MG/DL (ref 0.66–1.25)
EOSINOPHIL # BLD AUTO: 0.1 10E3/UL (ref 0–0.7)
EOSINOPHIL NFR BLD AUTO: 2 %
ERYTHROCYTE [DISTWIDTH] IN BLOOD BY AUTOMATED COUNT: 12.1 % (ref 10–15)
GFR SERPL CREATININE-BSD FRML MDRD: 81 ML/MIN/1.73M2
GLUCOSE BLD-MCNC: 99 MG/DL (ref 70–99)
HCT VFR BLD AUTO: 38.9 % (ref 40–53)
HCV AB SERPL QL IA: NONREACTIVE
HDLC SERPL-MCNC: 35 MG/DL
HGB BLD-MCNC: 14 G/DL (ref 13.3–17.7)
HIV 1+2 AB+HIV1 P24 AG SERPL QL IA: NONREACTIVE
LDLC SERPL CALC-MCNC: 27 MG/DL
LYMPHOCYTES # BLD AUTO: 1.3 10E3/UL (ref 0.8–5.3)
LYMPHOCYTES NFR BLD AUTO: 28 %
MCH RBC QN AUTO: 30.9 PG (ref 26.5–33)
MCHC RBC AUTO-ENTMCNC: 36 G/DL (ref 31.5–36.5)
MCV RBC AUTO: 86 FL (ref 78–100)
MONOCYTES # BLD AUTO: 0.6 10E3/UL (ref 0–1.3)
MONOCYTES NFR BLD AUTO: 13 %
NEUTROPHILS # BLD AUTO: 2.6 10E3/UL (ref 1.6–8.3)
NEUTROPHILS NFR BLD AUTO: 56 %
NONHDLC SERPL-MCNC: 79 MG/DL
PLATELET # BLD AUTO: 207 10E3/UL (ref 150–450)
POTASSIUM BLD-SCNC: 4.3 MMOL/L (ref 3.4–5.3)
PROT SERPL-MCNC: 7.5 G/DL (ref 6.8–8.8)
RBC # BLD AUTO: 4.53 10E6/UL (ref 4.4–5.9)
SODIUM SERPL-SCNC: 139 MMOL/L (ref 133–144)
TRIGL SERPL-MCNC: 262 MG/DL
WBC # BLD AUTO: 4.6 10E3/UL (ref 4–11)

## 2021-09-09 PROCEDURE — 99396 PREV VISIT EST AGE 40-64: CPT | Performed by: INTERNAL MEDICINE

## 2021-09-09 PROCEDURE — 87389 HIV-1 AG W/HIV-1&-2 AB AG IA: CPT | Performed by: INTERNAL MEDICINE

## 2021-09-09 PROCEDURE — 80061 LIPID PANEL: CPT | Performed by: INTERNAL MEDICINE

## 2021-09-09 PROCEDURE — 86803 HEPATITIS C AB TEST: CPT | Performed by: INTERNAL MEDICINE

## 2021-09-09 PROCEDURE — 36415 COLL VENOUS BLD VENIPUNCTURE: CPT | Performed by: INTERNAL MEDICINE

## 2021-09-09 PROCEDURE — 80053 COMPREHEN METABOLIC PANEL: CPT | Performed by: INTERNAL MEDICINE

## 2021-09-09 PROCEDURE — 85025 COMPLETE CBC W/AUTO DIFF WBC: CPT | Performed by: INTERNAL MEDICINE

## 2021-09-09 RX ORDER — CARVEDILOL 12.5 MG/1
18.75 TABLET ORAL 2 TIMES DAILY
Qty: 270 TABLET | Refills: 3 | Status: SHIPPED | OUTPATIENT
Start: 2021-09-09 | End: 2022-12-05

## 2021-09-09 RX ORDER — ROSUVASTATIN CALCIUM 40 MG/1
40 TABLET, COATED ORAL DAILY
Qty: 90 TABLET | Refills: 3 | Status: SHIPPED | OUTPATIENT
Start: 2021-09-09 | End: 2022-09-07

## 2021-09-09 RX ORDER — LISINOPRIL 20 MG/1
TABLET ORAL
Qty: 180 TABLET | Refills: 3 | Status: SHIPPED | OUTPATIENT
Start: 2021-09-09 | End: 2022-12-05

## 2021-09-09 NOTE — LETTER
September 10, 2021      Nathan Thom Lazaro  [address] [facility] [address]        Dear ,    We are writing to inform you of your test results.    I am happy to report that your cbc or complete blood count is normal with no signs of anemia, leukemia or platelet abnormalities.  Your chemistry panel shows no signs of diabetes.  Your blood salts, kidney tests, liver tests, hiv test, hepatitis C  test, and proteins are all fine.  The elevated bilirubin is not unusual and due to fasting for the test.     Your total cholesterol is 114 with the normal range being below 200.  Your HDL or good cholesterol is 35 with the normal range being above 40.  Your LDL or bad cholesterol is 27 with the normal range being below 130.  The hdl is a bit lower this year and the triglycerides are higher, both of which should improve some with weight loss.     I am happy to bring you this overall excellent report.        Resulted Orders   Comprehensive metabolic panel   Result Value Ref Range    Sodium 139 133 - 144 mmol/L    Potassium 4.3 3.4 - 5.3 mmol/L    Chloride 107 94 - 109 mmol/L    Carbon Dioxide (CO2) 27 20 - 32 mmol/L    Anion Gap 5 3 - 14 mmol/L    Urea Nitrogen 23 7 - 30 mg/dL    Creatinine 1.11 0.66 - 1.25 mg/dL    Calcium 8.9 8.5 - 10.1 mg/dL    Glucose 99 70 - 99 mg/dL    Alkaline Phosphatase 81 40 - 150 U/L    AST 30 0 - 45 U/L    ALT 38 0 - 70 U/L    Protein Total 7.5 6.8 - 8.8 g/dL    Albumin 3.9 3.4 - 5.0 g/dL    Bilirubin Total 1.6 (H) 0.2 - 1.3 mg/dL    GFR Estimate 81 >60 mL/min/1.73m2      Comment:      As of July 11, 2021, eGFR is calculated by the CKD-EPI creatinine equation, without race adjustment. eGFR can be influenced by muscle mass, exercise, and diet. The reported eGFR is an estimation only and is only applicable if the renal function is stable.   Lipid Profile   Result Value Ref Range    Cholesterol 114 <200 mg/dL      Comment:      Age 0-19 years  Desirable: <170 mg/dL  Borderline high:  170-199  mg/dl  High:            >199 mg/dl    Age 20 years and older  Desirable: <200 mg/dL    Triglycerides 262 (H) <150 mg/dL      Comment:      0-9 years:  Normal:    Less than 75 mg/dL  Borderline high:  75-99 mg/dL  High:             Greater than or equal to 100 mg/dL    0-19 years:  Normal:    Less than 90 mg/dL  Borderline high:   mg/dL  High:             Greater than or equal to 130 mg/dL    20 years and older:  Normal:    Less than 150 mg/dL  Borderline high:  150-199 mg/dL  High:             200-499 mg/dL  Very high:   Greater than or equal to 500 mg/dL    Direct Measure HDL 35 (L) >=40 mg/dL      Comment:      0-19 years:       Greater than or equal to 45 mg/dL   Low: Less than 40 mg/dL   Borderline low: 40-44 mg/dL     20 years and older:   Female: Greater than or equal to 50 mg/dL   Male:   Greater than or equal to 40 mg/dL         LDL Cholesterol Calculated 27 <=100 mg/dL      Comment:      Age 0-19 years:  Desirable: 0-110 mg/dL   Borderline high: 110-129 mg/dL   High: >= 130 mg/dL    Age 20 years and older:  Desirable: <100mg/dL  Above desirable: 100-129 mg/dL   Borderline high: 130-159 mg/dL   High: 160-189 mg/dL   Very high: >= 190 mg/dL    Non HDL Cholesterol 79 <130 mg/dL      Comment:      0-19 years:  Desirable:          Less than 120 mg/dL  Borderline high:   120-144 mg/dL  High:                   Greater than or equal to 145 mg/dL    20 years and older:  Desirable:          130 mg/dL  Above Desirable: 130-159 mg/dL  Borderline high:   160-189 mg/dL  High:               190-219 mg/dL  Very high:     Greater than or equal to 220 mg/dL   Hepatitis C antibody   Result Value Ref Range    Hepatitis C Antibody Nonreactive Nonreactive    Narrative    Assay performance characteristics have not been established for newborns, infants, and children.   HIV Antigen Antibody Combo   Result Value Ref Range    HIV Antigen Antibody Combo Nonreactive Nonreactive      Comment:      HIV-1 p24 Ag & HIV-1/HIV-2 Ab  Not Detected   CBC with platelets and differential   Result Value Ref Range    WBC Count 4.6 4.0 - 11.0 10e3/uL    RBC Count 4.53 4.40 - 5.90 10e6/uL    Hemoglobin 14.0 13.3 - 17.7 g/dL    Hematocrit 38.9 (L) 40.0 - 53.0 %    MCV 86 78 - 100 fL    MCH 30.9 26.5 - 33.0 pg    MCHC 36.0 31.5 - 36.5 g/dL    RDW 12.1 10.0 - 15.0 %    Platelet Count 207 150 - 450 10e3/uL    % Neutrophils 56 %    % Lymphocytes 28 %    % Monocytes 13 %    % Eosinophils 2 %    % Basophils 1 %    Absolute Neutrophils 2.6 1.6 - 8.3 10e3/uL    Absolute Lymphocytes 1.3 0.8 - 5.3 10e3/uL    Absolute Monocytes 0.6 0.0 - 1.3 10e3/uL    Absolute Eosinophils 0.1 0.0 - 0.7 10e3/uL    Absolute Basophils 0.0 0.0 - 0.2 10e3/uL       If you have any questions or concerns, please call the clinic at the number listed above.       Sincerely,      Hamilton Edge MD

## 2021-09-09 NOTE — PROGRESS NOTES
SUBJECTIVE:   CC: Nathan Willis is an 45 year old male who presents for preventative health visit.     The patient had tongue ca resected at [facility] as noted and has had reg follow up and ct scan and siva.  No c/o on review of systems, works out reg, weight up some.    He is , 3 kids, lives [place].      Patient has been advised of split billing requirements and indicates understanding: Yes  Healthy Habits:     Getting at least 3 servings of Calcium per day:  Yes    Bi-annual eye exam:  Yes    Dental care twice a year:  Yes    Sleep apnea or symptoms of sleep apnea:  None    Diet:  Low fat/cholesterol    Frequency of exercise:  4-5 days/week    Duration of exercise:  45-60 minutes    Taking medications regularly:  Yes    Medication side effects:  None    PHQ-2 Total Score: 0    Additional concerns today:  No              Today's PHQ-2 Score:   PHQ-2 ( 1999 [employer]) 9/6/2021   Q1: Little interest or pleasure in doing things 0   Q2: Feeling down, depressed or hopeless 0   PHQ-2 Score 0   Q1: Little interest or pleasure in doing things Not at all   Q2: Feeling down, depressed or hopeless Not at all   PHQ-2 Score 0       Abuse: Current or Past(Physical, Sexual or Emotional)- No  Do you feel safe in your environment? Yes               Past Medical History:      Past Medical History:   Diagnosis Date     Anemia 2019    nl iron, folic acid, spep, b12, haptoglobin, seen by heme and back to nl, no fu needed     Anxiety and depression 2004    Dr. Nicole Soler     ASCVD (arteriosclerotic cardiovascular disease) 06/2019    stemi, emergent angio done with 90% mid rca very large vessel, ptca and stented, 2nd marginal 30%, ostial 30%, mid lad 50% sequential lesions, nl ef, possible outflow obstruction on echo     Hypertension 05/2019     Tongue cancer (H) 05/2021    resected at [place]             Past Surgical History:      Past Surgical History:   Procedure Laterality Date     BIOPSY       CV CORONARY ANGIOGRAM N/A  5/31/2019    Procedure: Coronary Angiogram;  Surgeon: Baltazar Gómez MD;  Location:  [facility]     CV LEFT HEART CATH N/A 5/31/2019    Procedure: Left Heart Cath;  Surgeon: Baltazar Gómez MD;  Location:  [facility]     CV LEFT VENTRICULOGRAM N/A 5/31/2019    Procedure: Left Ventriculogram;  Surgeon: Baltazar Gómez MD;  Location:  [facility]     CV PCI STENT DRUG ELUTING N/A 5/31/2019    Procedure: PCI Stent Drug Eluting;  Surgeon: Baltazar Gómez MD;  Location:  HE[facility]     HEAD & NECK SURGERY  5/3/21     VASCULAR SURGERY  May 2019    Stent     wisdom teeth removed  college             Social History:     Social History     Socioeconomic History     Marital status:      Spouse name: Not on file     Number of children: 3     Years of education: Not on file     Highest education level: Not on file   Occupational History     Occupation:    Tobacco Use     Smoking status: Never Smoker     Smokeless tobacco: Never Used   Substance and Sexual Activity     Alcohol use: Yes     Comment: occasional     Drug use: No     Sexual activity: Yes     Partners: Female     Birth control/protection: I.U.D.   Other Topics Concern     Parent/sibling w/ CABG, MI or angioplasty before 65F 55M? No   Social History Narrative     Not on file     Social Determinants of Health     Financial Resource Strain:      Difficulty of Paying Living Expenses:    Food Insecurity:      Worried About Running Out of Food in the Last Year:      Ran Out of Food in the Last Year:    Transportation Needs:      Lack of Transportation (Medical):      Lack of Transportation (Non-Medical):    Physical Activity:      Days of Exercise per Week:      Minutes of Exercise per Session:    Stress:      Feeling of Stress :    Social Connections:      Frequency of Communication with Friends and Family:      Frequency of Social Gatherings with Friends and Family:      Attends  Christianity Services:      Active Member of Clubs or Organizations:      Attends Club or Organization Meetings:      Marital Status:    Intimate Partner Violence:      Fear of Current or Ex-Partner:      Emotionally Abused:      Physically Abused:      Sexually Abused:              Family History:   reviewed         Allergies:   No Known Allergies          Medications:     Current Outpatient Medications   Medication Sig Dispense Refill     carvedilol (COREG) 12.5 MG tablet Take 1.5 tablets (18.75 mg) by mouth 2 times daily 270 tablet 3     lisinopril (ZESTRIL) 20 MG tablet TAKE 1 TABLET BY MOUTH TWICE A  tablet 3     rosuvastatin (CRESTOR) 40 MG tablet Take 1 tablet (40 mg) by mouth daily 90 tablet 3     ticagrelor (BRILINTA) 90 MG tablet TAKE 1 TABLET (90 MG) BY MOUTH EVERY 12 HOURS 180 tablet 3     aspirin (ASA) 81 MG EC tablet Take 1 tablet (81 mg) by mouth daily       escitalopram (LEXAPRO) 20 MG tablet Take 1 tablet (20 mg) by mouth daily       nitroGLYcerin (NITROSTAT) 0.4 MG sublingual tablet For chest pain place 1 tablet under the tongue every 5 minutes for 3 doses. If symptoms persist 5 minutes after 1st dose call 911. 25 tablet 0               Review of Systems:   The 10 point Review of Systems is negative other than noted in the HPI           Physical Exam:   Blood pressure 134/86, pulse 70, weight 101.4 kg (223 lb 8 oz).    Exam:  Constitutional: healthy appearing, alert and in no distress  Heent: Normocephalic. Head without obvious masses or lesions. PERRLDC, EOMI. Mouth exam within normal limits: tongue, mucous membranes, posterior pharynx all normal, no lesions or abnormalities seen.  Tm's and canals within normal limits bilaterally. Neck supple, no nuchal rigidity or masses. No supraclavicular, or cervical adenopathy. Thyroid symmetric, no masses.  Cardiovascular: Regular rate and rhythm, no murmer, rub or gallops.  JVP not elevated, no edema.  Carotids within normal limits bilaterally, no  bruits.  Respiratory: Normal respiratory effort.  Lungs clear, normal flow, no wheezing or crackles.  Breasts: Normal bilaterally.  No masses or lesions.  Nipples within normal limites.  No axillary lesions or nodes.  Gastrointestinal: Normal active bowel sounds.   Soft, not tender, no masses, guarding or rebound.  No hepatosplenomegaly.   Genitourinary: Rectal not done  Musculoskeletal: extremities normal, no gross deformities noted.  Skin: no suspicious lesions or rashes   Neurologic: Mental status within normal limits.  Speech fluent.  No gross motor abnormalities and gait intact.  Psychiatric: mentation appears normal and affect normal.         Data:   Labs sent        Assessment:   1. Normal complete physical exam  2. Cad, stable, med mgmt  3. Hypertension, controlled  4. Elevated cholesterol on statin  5. Head and neck ca, siva, has reg Concepcion follow up  6. hcm         Plan:   Up to date immunizations  Letter with labs  Weight loss  exercise      Hamilton Edge M.D.        Answers for HPI/ROS submitted by the patient on 9/6/2021  abdominal pain: No  Blood in stool: No  Blood in urine: No  chest pain: No  chills: No  congestion: No  constipation: No  cough: No  diarrhea: No  dizziness: No  ear pain: No  eye pain: No  nervous/anxious: No  fever: No  frequency: No  genital sores: No  headaches: No  hearing loss: No  heartburn: No  arthralgias: No  joint swelling: No  peripheral edema: No  mood changes: No  myalgias: No  nausea: No  dysuria: No  palpitations: No  Skin sensation changes: No  sore throat: No  urgency: No  rash: No  shortness of breath: No  visual disturbance: No  weakness: No  impotence: No  penile discharge: No

## 2021-09-10 NOTE — RESULT ENCOUNTER NOTE
Chacorta,    It was a pleasure seeing you for your physical examination.  I wanted to get back to you with your test results.  I have enclosed a copy for your review.      I am happy to report that your cbc or complete blood count is normal with no signs of anemia, leukemia or platelet abnormalities.  Your chemistry panel shows no signs of diabetes.  Your blood salts, kidney tests, liver tests, hiv test, hepatitis C  test, and proteins are all fine.  The elevated bilirubin is not unusual and due to fasting for the test.    Your total cholesterol is 114 with the normal range being below 200.  Your HDL or good cholesterol is 35 with the normal range being above 40.  Your LDL or bad cholesterol is 27 with the normal range being below 130.  The hdl is a bit lower this year and the triglycerides are higher, both of which should improve some with weight loss.    I am happy to bring you this overall excellent report.  If you have any questions please call me.    Hamilton

## 2021-09-21 ENCOUNTER — OFFICE VISIT (OUTPATIENT)
Dept: CARDIOLOGY | Facility: CLINIC | Age: 46
End: 2021-09-21
Payer: COMMERCIAL

## 2021-09-21 VITALS
WEIGHT: 225 LBS | HEIGHT: 71 IN | SYSTOLIC BLOOD PRESSURE: 121 MMHG | BODY MASS INDEX: 31.5 KG/M2 | DIASTOLIC BLOOD PRESSURE: 82 MMHG | HEART RATE: 59 BPM | OXYGEN SATURATION: 98 %

## 2021-09-21 DIAGNOSIS — I21.11 STEMI INVOLVING RIGHT CORONARY ARTERY (H): Primary | ICD-10-CM

## 2021-09-21 DIAGNOSIS — C02.9 TONGUE CANCER (H): ICD-10-CM

## 2021-09-21 PROCEDURE — 99214 OFFICE O/P EST MOD 30 MIN: CPT | Performed by: INTERNAL MEDICINE

## 2021-09-21 ASSESSMENT — MIFFLIN-ST. JEOR: SCORE: 1937.72

## 2021-09-21 NOTE — PROGRESS NOTES
"Cardiology Progress Note          Assessment and Plan:       1. Coronary artery disease status post PCI of the RCA with early revascularization 2019, medically managed LAD disease currently asymptomatic.    Continue maximum dose statin and risk factor modification.    Follow-up in 1 to 2 years.    30 minutes was spent with the patient, precharting and reviewing tests as well as post visit charting all done today..    This note was transcribed using electronic voice recognition software and there may be typographical errors present.                    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  He has medically managed LAD disease.  He has tolerated Brilinta for 2 years and is on maximum dose statin.  He was recently diagnosed with tongue cancer and it was localized and he had it resected at [place].  His father  at a young age of tongue cancer, so he was vigilant.    He denies any fatigue or dyspnea on exertion.  He feels well from a cardiovascular standpoint.                     Review of Systems:     Review of Systems:  Skin:  Positive for bruising   Eyes:  Positive for glasses  ENT:  Negative    Respiratory:  Negative    Cardiovascular:  Negative    Gastroenterology: Negative    Genitourinary:  Negative    Musculoskeletal:  Negative    Neurologic:  Negative    Psychiatric:  Positive for anxiety;depression  Heme/Lymph/Imm:  not assessed    Endocrine:  Negative thyroid disorder;diabetes              Physical Exam:     Vitals: /82   Pulse 59   Ht 1.803 m (5' 11\")   Wt 102.1 kg (225 lb)   SpO2 98%   BMI 31.38 kg/m    Constitutional:  cooperative, alert and oriented, well developed, well nourished, in no acute distress        Skin:  warm and dry to the touch, no apparent skin lesions or masses noted        Head:  normocephalic, no masses or lesions        Eyes:  pupils equal and " round, conjunctivae and lids unremarkable, sclera white, no xanthalasma, EOMS intact, no nystagmus        Chest:  normal symmetry        Cardiac: normal S1 and S2;no murmurs, gallops or rubs detected                  Extremities and Back:  no deformities, clubbing, cyanosis, erythema observed        Neurological:  no gross motor deficits;affect appropriate                 Medications:     Current Outpatient Medications   Medication Sig Dispense Refill     aspirin (ASA) 81 MG EC tablet Take 1 tablet (81 mg) by mouth daily       carvedilol (COREG) 12.5 MG tablet Take 1.5 tablets (18.75 mg) by mouth 2 times daily 270 tablet 3     escitalopram (LEXAPRO) 20 MG tablet Take 1 tablet (20 mg) by mouth daily       lisinopril (ZESTRIL) 20 MG tablet TAKE 1 TABLET BY MOUTH TWICE A  tablet 3     nitroGLYcerin (NITROSTAT) 0.4 MG sublingual tablet For chest pain place 1 tablet under the tongue every 5 minutes for 3 doses. If symptoms persist 5 minutes after 1st dose call 911. 25 tablet 0     rosuvastatin (CRESTOR) 40 MG tablet Take 1 tablet (40 mg) by mouth daily 90 tablet 3                Data:   All laboratory data reviewed  No results found for this or any previous visit (from the past 24 hour(s)).    All laboratory data reviewed  Lab Results   Component Value Date    CHOL 114 09/09/2021    CHOL 89 09/04/2020     Lab Results   Component Value Date    HDL 35 09/09/2021    HDL 50 09/04/2020     Lab Results   Component Value Date    LDL 27 09/09/2021    LDL 26 09/04/2020     Lab Results   Component Value Date    TRIG 262 09/09/2021    TRIG 64 09/04/2020     Lab Results   Component Value Date    CHOLHDLRATIO 3.9 01/13/2015     TSH   Date Value Ref Range Status   12/12/2019 1.52 0.40 - 4.00 mU/L Final     Last Basic Metabolic Panel:  Lab Results   Component Value Date     09/09/2021     09/04/2020      Lab Results   Component Value Date    POTASSIUM 4.3 09/09/2021    POTASSIUM 4.3 09/04/2020     Lab Results    Component Value Date    CHLORIDE 107 09/09/2021    CHLORIDE 107 09/04/2020     Lab Results   Component Value Date    NICOLE 8.9 09/09/2021    NICOLE 9.1 09/04/2020     Lab Results   Component Value Date    CO2 27 09/09/2021    CO2 26 09/04/2020     Lab Results   Component Value Date    BUN 23 09/09/2021    BUN 23 09/04/2020     Lab Results   Component Value Date    CR 1.11 09/09/2021    CR 0.96 09/04/2020     Lab Results   Component Value Date    GLC 99 09/09/2021    GLC 90 09/04/2020     Lab Results   Component Value Date    WBC 4.6 09/09/2021    WBC 4.0 09/04/2020     Lab Results   Component Value Date    RBC 4.53 09/09/2021    RBC 4.41 09/04/2020     Lab Results   Component Value Date    HGB 14.0 09/09/2021    HGB 13.6 09/04/2020     Lab Results   Component Value Date    HCT 38.9 09/09/2021    HCT 39.1 09/04/2020     Lab Results   Component Value Date    MCV 86 09/09/2021    MCV 89 09/04/2020     Lab Results   Component Value Date    MCH 30.9 09/09/2021    MCH 30.8 09/04/2020     Lab Results   Component Value Date    MCHC 36.0 09/09/2021    MCHC 34.8 09/04/2020     Lab Results   Component Value Date    RDW 12.1 09/09/2021    RDW 12.1 09/04/2020     Lab Results   Component Value Date     09/09/2021     09/04/2020

## 2021-09-21 NOTE — LETTER
"2021    Hamilton Edge MD  [address]  [facility] [address]    RE: Nathan Willis       Dear Colleague,    I had the pleasure of seeing Nathan Willis in the [facility].    Cardiology Progress Note          Assessment and Plan:       1. Coronary artery disease status post PCI of the RCA with early revascularization 2019, medically managed LAD disease currently asymptomatic.    Continue maximum dose statin and risk factor modification.    Follow-up in 1 to 2 years.    30 minutes was spent with the patient, precharting and reviewing tests as well as post visit charting all done today..    This note was transcribed using electronic voice recognition software and there may be typographical errors present.                    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  He has medically managed LAD disease.  He has tolerated Brilinta for 2 years and is on maximum dose statin.  He was recently diagnosed with tongue cancer and it was localized and he had it resected at [place].  His father  at a young age of tongue cancer, so he was vigilant.    He denies any fatigue or dyspnea on exertion.  He feels well from a cardiovascular standpoint.                     Review of Systems:     Review of Systems:  Skin:  Positive for bruising   Eyes:  Positive for glasses  ENT:  Negative    Respiratory:  Negative    Cardiovascular:  Negative    Gastroenterology: Negative    Genitourinary:  Negative    Musculoskeletal:  Negative    Neurologic:  Negative    Psychiatric:  Positive for anxiety;depression  Heme/Lymph/Imm:  not assessed    Endocrine:  Negative thyroid disorder;diabetes              Physical Exam:     Vitals: /82   Pulse 59   Ht 1.803 m (5' 11\")   Wt 102.1 kg (225 lb)   SpO2 98%   BMI 31.38 kg/m  "   Constitutional:  cooperative, alert and oriented, well developed, well nourished, in no acute distress        Skin:  warm and dry to the touch, no apparent skin lesions or masses noted        Head:  normocephalic, no masses or lesions        Eyes:  pupils equal and round, conjunctivae and lids unremarkable, sclera white, no xanthalasma, EOMS intact, no nystagmus        Chest:  normal symmetry        Cardiac: normal S1 and S2;no murmurs, gallops or rubs detected                  Extremities and Back:  no deformities, clubbing, cyanosis, erythema observed        Neurological:  no gross motor deficits;affect appropriate                 Medications:     Current Outpatient Medications   Medication Sig Dispense Refill     aspirin (ASA) 81 MG EC tablet Take 1 tablet (81 mg) by mouth daily       carvedilol (COREG) 12.5 MG tablet Take 1.5 tablets (18.75 mg) by mouth 2 times daily 270 tablet 3     escitalopram (LEXAPRO) 20 MG tablet Take 1 tablet (20 mg) by mouth daily       lisinopril (ZESTRIL) 20 MG tablet TAKE 1 TABLET BY MOUTH TWICE A  tablet 3     nitroGLYcerin (NITROSTAT) 0.4 MG sublingual tablet For chest pain place 1 tablet under the tongue every 5 minutes for 3 doses. If symptoms persist 5 minutes after 1st dose call 911. 25 tablet 0     rosuvastatin (CRESTOR) 40 MG tablet Take 1 tablet (40 mg) by mouth daily 90 tablet 3                Data:   All laboratory data reviewed  No results found for this or any previous visit (from the past 24 hour(s)).    All laboratory data reviewed  Lab Results   Component Value Date    CHOL 114 09/09/2021    CHOL 89 09/04/2020     Lab Results   Component Value Date    HDL 35 09/09/2021    HDL 50 09/04/2020     Lab Results   Component Value Date    LDL 27 09/09/2021    LDL 26 09/04/2020     Lab Results   Component Value Date    TRIG 262 09/09/2021    TRIG 64 09/04/2020     Lab Results   Component Value Date    CHOLHDLRATIO 3.9 01/13/2015     TSH   Date Value Ref Range Status    12/12/2019 1.52 0.40 - 4.00 mU/L Final     Last Basic Metabolic Panel:  Lab Results   Component Value Date     09/09/2021     09/04/2020      Lab Results   Component Value Date    POTASSIUM 4.3 09/09/2021    POTASSIUM 4.3 09/04/2020     Lab Results   Component Value Date    CHLORIDE 107 09/09/2021    CHLORIDE 107 09/04/2020     Lab Results   Component Value Date    NICOLE 8.9 09/09/2021    NICOLE 9.1 09/04/2020     Lab Results   Component Value Date    CO2 27 09/09/2021    CO2 26 09/04/2020     Lab Results   Component Value Date    BUN 23 09/09/2021    BUN 23 09/04/2020     Lab Results   Component Value Date    CR 1.11 09/09/2021    CR 0.96 09/04/2020     Lab Results   Component Value Date    GLC 99 09/09/2021    GLC 90 09/04/2020     Lab Results   Component Value Date    WBC 4.6 09/09/2021    WBC 4.0 09/04/2020     Lab Results   Component Value Date    RBC 4.53 09/09/2021    RBC 4.41 09/04/2020     Lab Results   Component Value Date    HGB 14.0 09/09/2021    HGB 13.6 09/04/2020     Lab Results   Component Value Date    HCT 38.9 09/09/2021    HCT 39.1 09/04/2020     Lab Results   Component Value Date    MCV 86 09/09/2021    MCV 89 09/04/2020     Lab Results   Component Value Date    MCH 30.9 09/09/2021    MCH 30.8 09/04/2020     Lab Results   Component Value Date    MCHC 36.0 09/09/2021    MCHC 34.8 09/04/2020     Lab Results   Component Value Date    RDW 12.1 09/09/2021    RDW 12.1 09/04/2020     Lab Results   Component Value Date     09/09/2021     09/04/2020                   Thank you for allowing me to participate in the care of your patient.      Sincerely,     Taye Pittman MD     [facility]  cc:   No referring provider defined for this encounter.

## 2021-10-03 ENCOUNTER — HEALTH MAINTENANCE LETTER (OUTPATIENT)
Age: 46
End: 2021-10-03

## 2022-09-11 ENCOUNTER — HEALTH MAINTENANCE LETTER (OUTPATIENT)
Age: 47
End: 2022-09-11

## 2022-10-05 NOTE — PROGRESS NOTES
[facility]    Medicine Progress Note - Hospitalist Service       Date of Admission:  5/31/2019  Assessment & Plan   Mr. Chacorta Willis is a 43-year-old  gentleman with a past medical history notable for depression/anxiety, who presented with an acute onset of precordial chest pain with the findings of inferior ST elevation myocardial infarction.  He was taken urgently to the Cath Lab and underwent PCI with stent to RCA.  He is admitted to the Hospitalist Service.     Acute inferior STEMI  The patient presented with acute onset of precordial chest pain and ST elevation in inferior leads on EKG. Taken urgently to the Cath Lab.  Angiogram revealed 90% stenosis of mid RCA, 30% stenosis of second obtuse marginal artery and 50% mid-LAD lesion.   * S/p PCI was performed RAY to the mid RCA.  LV function was hyperdynamic with EF of 60% to 65%.    Hypertension - BP on 06/01/19, reasonably controlled 134/94.   Hyperlipidemia (, HDL 37, )   - Echocardiogram.   - Started on ASA 81 mg p.o. daily, Brilinta 90 mg p.o. b.i.d., lisinopril 10 mg p.o. daily, Carvedilol 12.5 mg p.o. b.i.d., and Crestor 40 mg qd during this stay.    - Cardiac rehab.  - Further management, determination of discharge per Cardiology Service.     Depression/anxiety.  Stable.    - Continue PTA Lexapro 20 mg p.o. daily.         Diet: Combination Diet Low Saturated Fat Na <2400mg Diet    DVT Prophylaxis: Low Risk/Ambulatory with no VTE prophylaxis indicated. AMBULATE  Bill Catheter: not present  Code Status: Full Code      Disposition Plan   Expected discharge: Tomorrow, recommended to prior living arrangement once cleared by cardiology. .  Entered: Lisa Perrin MD 06/01/2019, 10:02 AM     The patient's care was discussed with the Patient's Family.    Lisa Perrin MD  Hospitalist Service  [facility]    ______________________________________________________________________    Interval History   Feeling  well today up and walking, going to cardiac rehab. No SOB or CP. Afebrile, blood pressure okay with some diastolic in the 90s. Discussed all his new medications and rationale behind them.     Data reviewed today: I reviewed all medications, new labs and imaging results over the last 24 hours. I personally reviewed no images or EKG's today.    Physical Exam   Vital Signs: Temp: 98.2  F (36.8  C) Temp src: Oral BP: (!) 134/94 Pulse: 65 Heart Rate: 70 Resp: 14 SpO2: 97 % O2 Device: None (Room air)    Weight: 226 lbs 8 oz  Constitutional: NAD,   Neuropsyche:  alert and oriented, answers questions appropriately.   Respiratory:  Breathing comfortably, good air exchange, no wheezes, no crackles.   Cardiovascular:  Regular rate and rhythm, no edema.  GI:  soft, NT/ND, BS normal  Skin/Integumen:  No acute rash, bruising or sign of bleeding.       Data   Recent Labs   Lab 06/01/19  0526 06/01/19  0009 05/31/19 2048 05/31/19  1735   WBC 7.0  --   --  6.4   HGB 14.0  --   --  14.7   MCV 85  --   --  85     --   --  254     --   --  140   POTASSIUM 3.7  --   --  4.0   CHLORIDE 109  --   --  107   CO2 26  --   --  30   BUN 12  --   --  15   CR 0.82  --   --  0.94   ANIONGAP 5  --   --  3   NICOLE 8.4*  --   --  8.6   GLC 91  --   --  86   ALBUMIN  --   --   --  4.2   PROTTOTAL  --   --   --  8.0   BILITOTAL  --   --   --  0.8   ALKPHOS  --   --   --  89   ALT  --   --   --  61   AST  --   --   --  33   TROPI 2.657* 1.758* 0.774* 0.064*     Recent Results (from the past 24 hour(s))   Chest  XR, 1 view PORTABLE    Narrative    CHEST SINGLE VIEW PORTABLE   5/31/2019 5:51 PM     HISTORY: Chest pain.    COMPARISON: None.    FINDINGS: The lungs are clear. Normal-sized cardiac silhouette.      Impression    IMPRESSION: No evidence of active cardiopulmonary disease.    SURAJ HUGHES MD     Medications     Percutaneous Coronary Intervention orders placed (this is information for BPA alerting)         aspirin  81 mg Oral Daily      carvedilol  12.5 mg Oral BID     escitalopram  20 mg Oral Daily     lisinopril  10 mg Oral BID     rosuvastatin  40 mg Oral Daily     ticagrelor  90 mg Oral Q12H      Paroxysmal atrial fibrillation

## 2022-12-03 DIAGNOSIS — I21.11 STEMI INVOLVING RIGHT CORONARY ARTERY (H): ICD-10-CM

## 2022-12-04 DIAGNOSIS — I21.11 STEMI INVOLVING RIGHT CORONARY ARTERY (H): ICD-10-CM

## 2022-12-05 RX ORDER — ROSUVASTATIN CALCIUM 40 MG/1
TABLET, COATED ORAL
Qty: 90 TABLET | Refills: 0 | Status: SHIPPED | OUTPATIENT
Start: 2022-12-05 | End: 2023-02-27

## 2022-12-05 RX ORDER — LISINOPRIL 20 MG/1
TABLET ORAL
Qty: 180 TABLET | Refills: 0 | Status: SHIPPED | OUTPATIENT
Start: 2022-12-05 | End: 2023-02-27

## 2022-12-05 RX ORDER — CARVEDILOL 12.5 MG/1
18.75 TABLET ORAL 2 TIMES DAILY
Qty: 270 TABLET | Refills: 0 | Status: SHIPPED | OUTPATIENT
Start: 2022-12-05 | End: 2023-02-27

## 2022-12-05 NOTE — TELEPHONE ENCOUNTER
Addendum by Dr. Lurdes Hansen MD:  I have seen and examined the patient independently. Face to face evaluation and examination was performed. The below evaluation and note have been reviewed. Labs and radiographs were reviewed. I Have discussed with Neurosurgery PA about this patient in detail. The below assessment and plan have been reviewed. Please see my modifications mentioned below.      My additional comments and modifications:     -No acute event over the night.  -There is no significant change in patient surgical exam comparing with yesterday. Patient is awake and alert but confused, follows command by 4 good strength throughout.  -Continue the current medical management per hospitalist. service and pt's primary.  -Follow-up the neurology recommendation.  -Brain MRI :   1. No acute findings. 2. Late subacute infarct in the right thalamus. 3. Old infarcts in the basal ganglia and thalami bilaterally. 4. Moderate-severe chronic small vessel ischemic changes. 5. Small amount of recent hemorrhage at the level septum pellucidum and in the occipital horns of the lateral ventricles.      -No acute neurosurgical intervention is indicated at this time. -PT and OT.  -Up to the chair.  -Neurosurgery will follow.  Melanie Mccarthy MD      Neurosurgery Progress Note    Patient:  Ana Matamoros      Unit/Bed:4A-07/007-A    YOB: 1934    MRN: [id number]     Acct: [de-identified]     Admit date: 11/26/2020    Chief Complaint   Patient presents with    Loss of Consciousness       Patient Seen, Chart, Physician notes, Labs, Radiology studies reviewed. Subjective: Mr Amna Gabriel is seen and evaluated on the floor having transition from the ICU without incident, yesterday, with exam findings discussed with Dr. Lionel Reece is comfortable today and without significant complaints with pain well-controlled.  Denied any headache or visual disturbances on exam again this morning.     Past, Medication is being filled for 1 time refill only due to:  until reviewed at next scheduled appt x 2 mths   Patient notified to review and discuss additional refills at next appointment.        Karina MORA, RN      December 5, 2022  3:55 PM           Family, Social History unchanged from admission. Diet:  DIET GENERAL; No Added Salt (3-4 GM); Cardiac    Medications:  Scheduled Meds:   amLODIPine  5 mg Oral Daily    budesonide-formoterol  2 puff Inhalation BID    aspirin  81 mg Oral Once    losartan  100 mg Oral Daily    enoxaparin  40 mg Subcutaneous Q24H    metoprolol tartrate  50 mg Oral BID    sodium chloride flush  10 mL Intravenous 2 times per day    lidocaine  1 patch Transdermal Daily    bumetanide  1 mg Oral Daily    pravastatin  40 mg Oral Daily    tamsulosin  0.4 mg Oral Nightly     Continuous Infusions:   sodium chloride 50 mL/hr at 11/30/20 1002    niCARdipine Stopped (11/28/20 1226)     PRN Meds:enalaprilat, albuterol, sodium chloride flush, acetaminophen **OR** acetaminophen, polyethylene glycol, promethazine **OR** ondansetron, morphine **OR** morphine, HYDROcodone 5 mg - acetaminophen **OR** HYDROcodone 5 mg - acetaminophen    Objective:  Is sitting up in bed with a head of the bed elevated greater than 30 degrees enjoying breakfast this morning was without significant complaints on exam.  He remained stable and grossly intact to his baseline on exam with no changes noted to the patient chart overnight.  GCS remains 15 out of 15 and he is following commands with excellent strength for upper and lower extremities bilaterally and symmetrically and was intact to pinprick sensation. Vitals: BP (!) 159/82   Pulse 66   Temp 98 °F (36.7 °C) (Oral)   Resp 20   Ht 5' 9\" (1.753 m)   Wt 226 lb 3.2 oz (102.6 kg)   SpO2 94%   BMI 33.40 kg/m²   Physical Exam:  Alert and attentive. Language appropriate, with no aphasia. Pupils equal.  Facial strength symmetric.     Physical Exam  Patient remained stable and grossly intact on exam this morning with no significant complaints and no changes noted to the patient chart overnight.  And is tolerating PT and OT.      ROS:  Review of Systems  HENT: Negative for tinnitus.    Eyes: Positive for visual disturbance. Respiratory: Negative for chest tightness.    Cardiovascular: Negative for chest pain. Gastrointestinal: Negative for abdominal pain. Genitourinary: Negative for difficulty urinating. Musculoskeletal: Negative for back pain. Neurological: Negative for weakness. Psychiatric/Behavioral: Positive for confusion, improved from yesterday. . Negative for agitation    24 hour intake/output:    Intake/Output Summary (Last 24 hours) at 11/30/2020 1324  Last data filed at 11/30/2020 0839  Gross per 24 hour   Intake 338.92 ml   Output 225 ml   Net 113.92 ml     Last 3 weights: Wt Readings from Last 3 Encounters:   11/30/20 226 lb 3.2 oz (102.6 kg)   10/22/20 225 lb (102.1 kg)   10/15/20 225 lb (102.1 kg)         CBC:   Recent Labs     11/28/20 0402 11/29/20 0355 11/30/20  0331   WBC 6.0 6.2 6.0   HGB 14.6 15.5 15.0    150 141     BMP:    Recent Labs     11/28/20 0402 11/29/20 0355 11/30/20  0331    140 141   K 4.0 4.2 4.1    106 105   CO2 21* 23 23   BUN 17 20 23*   CREATININE 1.2 0.9 1.0   GLUCOSE 118* 106 99     Calcium:  Recent Labs     11/30/20 0331   CALCIUM 9.0     Magnesium:No results for input(s): MG in the last 72 hours. Glucose:No results for input(s): POCGLU in the last 72 hours. HgbA1C:   Recent Labs     11/28/20 0402   LABA1C 5.2     Lipids:   Recent Labs     11/28/20 0402   CHOL 166   TRIG 96   HDL 49   LDLCALC 98       Radiology reports as per the Radiologist  Radiology: Ct Head Wo Contrast    Result Date: 11/28/2020  CT head without contrast Comparison:  CT,SR  - CT HEAD WO CONTRAST  - 11/26/2020 10:44 PM EST Findings: No intracranial mass, midline shift or hydrocephalus. Involutional change of brain parenchyma, compatible with advanced age. Severe white matter disease. Hematoma within the left maxillary sinus. Left orbital floor fracture without change.      1. Small focus of intraventricular hemorrhage associated with the midportion of the septum pellucidum, without significant change. 2. Left orbital floor fracture with herniation of periorbital fat and the inferior rectus muscle into the maxillary sinus. This document has been electronically signed by: Jodi Jones MD on 11/28/2020 12:27 AM All CT scans at this facility use dose modulation, iterative reconstruction, and/or weight-based dosing when appropriate to reduce radiation dose to as low as reasonably achievable. Ct Head Wo Contrast    Result Date: 11/27/2020  PROCEDURE: CT HEAD WO CONTRAST CLINICAL INFORMATION: follow-up intraventricular hemorrhage. Headache. COMPARISON: Head CT 11/26/2020. TECHNIQUE: Noncontrast 5 mm axial images were obtained through the brain. Sagittal and coronal reconstructions were obtained. All CT scans at this facility use dose modulation, iterative reconstruction, and/or weight-based dosing when appropriate to reduce radiation dose to as low as reasonably achievable. FINDINGS: There are blood products layering in the occipital horns of the lateral ventricles bilaterally consistent with intraventricular hemorrhage. There is a stable area of hemorrhage within the septum pellucidum. No parenchymal hemorrhage is noted. There is a moderate-severe amount of abnormal low attenuation in the white matter the brain suggesting chronic small vessel ischemic changes. This is stable. There are small old infarcts in the nasal ganglia bilaterally. There is no midline shift. There is no hydrocephalus. The paranasal sinuses are normally aerated. There is some fluid attenuation in the inferior mastoid air cells on the right. There is no suspicious calvarial abnormality. 1. Small amount of intraventricular hemorrhage layering in the occipital horns of lateral ventricle. There is also small amount of hemorrhage within the septum pellucidum. 2. Moderate-severe chronic small vessel ischemic changes. **This report has been created using voice recognition software.  It may contain All CT scans at this facility use dose modulation, iterative reconstruction, and/or weight-based dosing when appropriate to reduce radiation dose to as low as reasonably achievable. Cta Chest W Wo Contrast    Result Date: 11/26/2020  CT angiography chest with contrast. 3D Postprocessing. Comparison:  CR,SR  - XR CHEST PORTABLE  - 11/26/2020 10:02 PM EST Findings: The pulmonary arteries are well opacified. Scattered plaque is seen in the thoracic aorta without aneurysm or dissection. The heart is not enlarged. There is no pericardial effusion. Coronary artery calcifications are seen. There are no enlarged mediastinal or hilar lymph nodes. The thyroid is unremarkable. No focal airspace consolidation, pleural effusion, or pneumothorax is seen. There are no worrisome pulmonary masses or nodules. Hypodense cysts in the liver. Small hiatal hernia. Nondisplaced left anterior 6th rib fracture. Degenerative changes in the spine with intact anterior cervical fusion. 1.  Nondisplaced left anterior 6th rib fracture. No pneumothorax or other acute visceral or vascular injury. 2.  Small hiatal hernia. This document has been electronically signed by: Burak Montalvo MD on 11/26/2020 11:34 PM All CT scans at this facility use dose modulation, iterative reconstruction, and/or weight-based dosing when appropriate to reduce radiation dose to as low as reasonably achievable. Ct Cervical Spine Wo Contrast    Result Date: 11/26/2020  CT cervical spine without contrast Comparison: None Findings: Visualized intracranial contents are unremarkable. No cervical fluid collections or masses. Lung apices are clear. Normal vertebral body alignment. No acute fractures or dislocations. Multilevel degenerative disc disease and facet osteoarthritis with central canal and neural foraminal narrowing at multiple levels. 1. No evidence of cervical spine fracture. 2. Prior anterior metallic and interbody fusion extending from C4-C6. Appropriate alignment. No evidence of hardware failure. This document has been electronically signed by: Holly Russ MD on 11/26/2020 11:37 PM All CT scans at this facility use dose modulation, iterative reconstruction, and/or weight-based dosing when appropriate to reduce radiation dose to as low as reasonably achievable. Xr Chest Portable    Result Date: 11/26/2020  1 view chest x-ray Comparison:  CR,SR  - XR CHEST STANDARD (2 VW)  - 04/29/2019 09:36 AM EDT Findings: The lungs are clear. Heart size is normal. No pulmonary vascular congestion. Stable cervical fusion hardware. Stable degenerative changes in the right shoulder. No acute findings. This document has been electronically signed by: Franklin Gutierrez MD on 11/26/2020 10:57 PM     Vl Dup Lower Extremity Venous Bilateral    Result Date: 11/19/2020  PROCEDURE: VL DUP LOWER EXTREMITY VENOUS BILATERAL CLINICAL INFORMATION: 24-year-old male with bilateral leg pain. COMPARISON: Ultrasound dated 3/30/2016. TECHNIQUE: Venous doppler ultrasound was performed of the bilateral lower extremities using gray scale, color flow and spectral doppler imaging. FINDINGS: There is normal color flow, spectral analysis and compressibility of the common femoral vein, superficial femoral vein and popliteal vein bilaterally . There is normal color flow and compressibility in the posterior tibial veins, anterior tibial veins and peroneal veins. No evidence of a DVT. **This report has been created using voice recognition software. It may contain minor errors which are inherent in voice recognition technology. ** Final report electronically signed by Dr Kamlesh Fleming on 11/19/2020 8:16 AM    Mri Brain W Wo Contrast    Result Date: 11/29/2020  PROCEDURE: MRI BRAIN W WO CONTRAST CLINICAL INFORMATIONunusual septum pellucidum ICH/IVH, r/o tumor, stroke. Head trauma. Loss of consciousness. COMPARISON: Head CT 11/27/2020.  TECHNIQUE: Multiplanar and multiple spin echo T1 and T2-weighted images were obtained through the brain before and after the administration of intravenous contrast. FINDINGS: On the diffusion-weighted images, there is a faint area of high signal in the right thalamus. This does not have low signal on the ADC map. There is some high signal on the FLAIR and T2-weighted sequences. This is not consistent with an acute infarct. This may represent a late subacute infarct. The brain volume is reduced. There is an area of susceptibility artifact within the leaflets of the septum pellucidum consistent with a small site of hemorrhage. There is also some blood products layering in the occipital horns of the lateral ventricles consistent with intraventricular hemorrhage. There is mineralization in the medial aspects of the basal ganglia bilaterally. There is an old microhemorrhage in the left frontal lobe. There is no hydrocephalus, midline shift or mass effect. On the FLAIR and T2-weighted sequences, there is a moderate-severe amount of abnormal signal in the white matter the brain suggesting chronic small vessel ischemic changes. There are old lacunar type infarcts in the thalami and basal ganglia bilaterally. There is no abnormal enhancement in the brain. The major intracranial vascular flow voids are present. The midline craniocervical junction structures are normal.  The brainstem and pituitary gland are normal.      1. No acute findings. 2. Late subacute infarct in the right thalamus. 3. Old infarcts in the basal ganglia and thalami bilaterally. 4. Moderate-severe chronic small vessel ischemic changes. 5. Small amount of recent hemorrhage at the level septum pellucidum and in the occipital horns of the lateral ventricles. **This report has been created using voice recognition software. It may contain minor errors which are inherent in voice recognition technology. ** Final report electronically signed by Dr. Matheus Keller on 11/29/2020 1:32 PM    A/P: S/P patient is seen and evaluated on the floor having transition from the ICU setting without incident. Examination and evaluation findings are discussed and reviewed with Dr. Rocco Garland. The patient remains stable and grossly intact neurologically to his baseline with no significant changes noted to the patient chart overnight. Vivian awake and oriented without headache on exam this morning. MRI performed this morning did not reveal any acute findings. Neurosurgery to follow.     Electronically signed by Katy Solomon PA-C on 11/30/2020 at 1:24 PM

## 2022-12-05 NOTE — TELEPHONE ENCOUNTER
Pending Prescriptions:                       Disp   Refills    rosuvastatin (CRESTOR) 40 MG tablet [Pharm*90 tab*0        Sig: TAKE 1 TABLET BY MOUTH EVERY DAY    Routing refill request to provider for review/approval because:  Labs not current:  LDL  Patient needs to be seen because it has been more than 1 year since last office visit.    Last OV was 9/9/2021. Patient has appointment scheduled but not until 2/27/2023.     Appointments in Next Year      Feb 27, 2023 11:30 AM  (Arrive by 11:10 AM)  Preventative Adult Visit with Hamilton Edge MD  [facility] ([facility] ) [phone]          Tiffani Taveras RN BSN MSN  [facility]

## 2023-01-22 ENCOUNTER — HEALTH MAINTENANCE LETTER (OUTPATIENT)
Age: 48
End: 2023-01-22

## 2023-02-21 ENCOUNTER — MYC MEDICAL ADVICE (OUTPATIENT)
Dept: FAMILY MEDICINE | Facility: CLINIC | Age: 48
End: 2023-02-21
Payer: COMMERCIAL

## 2023-02-21 ASSESSMENT — ENCOUNTER SYMPTOMS
DIZZINESS: 0
FREQUENCY: 0
HEARTBURN: 0
NAUSEA: 0
SORE THROAT: 0
JOINT SWELLING: 0
PALPITATIONS: 0
NERVOUS/ANXIOUS: 0
CONSTIPATION: 0
SHORTNESS OF BREATH: 0
FEVER: 0
HEMATURIA: 0
HEADACHES: 0
WEAKNESS: 0
HEMATOCHEZIA: 0
MYALGIAS: 0
ABDOMINAL PAIN: 0
DIARRHEA: 0
COUGH: 0
DYSURIA: 0
PARESTHESIAS: 0
EYE PAIN: 0
ARTHRALGIAS: 0
CHILLS: 0

## 2023-02-27 ENCOUNTER — OFFICE VISIT (OUTPATIENT)
Dept: FAMILY MEDICINE | Facility: CLINIC | Age: 48
End: 2023-02-27
Payer: COMMERCIAL

## 2023-02-27 VITALS
BODY MASS INDEX: 32.06 KG/M2 | RESPIRATION RATE: 16 BRPM | SYSTOLIC BLOOD PRESSURE: 131 MMHG | WEIGHT: 229 LBS | DIASTOLIC BLOOD PRESSURE: 88 MMHG | HEIGHT: 71 IN | OXYGEN SATURATION: 100 % | HEART RATE: 56 BPM

## 2023-02-27 DIAGNOSIS — Z12.11 SCREEN FOR COLON CANCER: ICD-10-CM

## 2023-02-27 DIAGNOSIS — F32.A ANXIETY AND DEPRESSION: ICD-10-CM

## 2023-02-27 DIAGNOSIS — F41.9 ANXIETY AND DEPRESSION: ICD-10-CM

## 2023-02-27 DIAGNOSIS — C02.9 TONGUE CANCER (H): ICD-10-CM

## 2023-02-27 DIAGNOSIS — Z00.00 ENCOUNTER FOR ANNUAL PHYSICAL EXAM: Primary | ICD-10-CM

## 2023-02-27 DIAGNOSIS — I25.10 ASCVD (ARTERIOSCLEROTIC CARDIOVASCULAR DISEASE): ICD-10-CM

## 2023-02-27 LAB
ALBUMIN SERPL BCG-MCNC: 4.5 G/DL (ref 3.5–5.2)
ALP SERPL-CCNC: 73 U/L (ref 40–129)
ALT SERPL W P-5'-P-CCNC: 26 U/L (ref 10–50)
ANION GAP SERPL CALCULATED.3IONS-SCNC: 9 MMOL/L (ref 7–15)
AST SERPL W P-5'-P-CCNC: 28 U/L (ref 10–50)
BILIRUB SERPL-MCNC: 1.2 MG/DL
BUN SERPL-MCNC: 19.1 MG/DL (ref 6–20)
CALCIUM SERPL-MCNC: 9.6 MG/DL (ref 8.6–10)
CHLORIDE SERPL-SCNC: 104 MMOL/L (ref 98–107)
CHOLEST SERPL-MCNC: 122 MG/DL
CREAT SERPL-MCNC: 0.92 MG/DL (ref 0.67–1.17)
DEPRECATED HCO3 PLAS-SCNC: 26 MMOL/L (ref 22–29)
ERYTHROCYTE [DISTWIDTH] IN BLOOD BY AUTOMATED COUNT: 11.9 % (ref 10–15)
GFR SERPL CREATININE-BSD FRML MDRD: >90 ML/MIN/1.73M2
GLUCOSE SERPL-MCNC: 100 MG/DL (ref 70–99)
HCT VFR BLD AUTO: 44.3 % (ref 40–53)
HDLC SERPL-MCNC: 48 MG/DL
HGB BLD-MCNC: 14.1 G/DL (ref 13.3–17.7)
LDLC SERPL CALC-MCNC: 58 MG/DL
MCH RBC QN AUTO: 29.9 PG (ref 26.5–33)
MCHC RBC AUTO-ENTMCNC: 31.8 G/DL (ref 31.5–36.5)
MCV RBC AUTO: 94 FL (ref 78–100)
NONHDLC SERPL-MCNC: 74 MG/DL
PLATELET # BLD AUTO: 217 10E3/UL (ref 150–450)
POTASSIUM SERPL-SCNC: 4.7 MMOL/L (ref 3.4–5.3)
PROT SERPL-MCNC: 7.2 G/DL (ref 6.4–8.3)
RBC # BLD AUTO: 4.72 10E6/UL (ref 4.4–5.9)
SODIUM SERPL-SCNC: 139 MMOL/L (ref 136–145)
TRIGL SERPL-MCNC: 79 MG/DL
WBC # BLD AUTO: 6 10E3/UL (ref 4–11)

## 2023-02-27 PROCEDURE — 80061 LIPID PANEL: CPT | Performed by: INTERNAL MEDICINE

## 2023-02-27 PROCEDURE — 85027 COMPLETE CBC AUTOMATED: CPT | Performed by: INTERNAL MEDICINE

## 2023-02-27 PROCEDURE — 36415 COLL VENOUS BLD VENIPUNCTURE: CPT | Performed by: INTERNAL MEDICINE

## 2023-02-27 PROCEDURE — 99396 PREV VISIT EST AGE 40-64: CPT | Performed by: INTERNAL MEDICINE

## 2023-02-27 PROCEDURE — 80053 COMPREHEN METABOLIC PANEL: CPT | Performed by: INTERNAL MEDICINE

## 2023-02-27 RX ORDER — CARVEDILOL 12.5 MG/1
18.75 TABLET ORAL 2 TIMES DAILY
Qty: 270 TABLET | Refills: 0 | Status: SHIPPED | OUTPATIENT
Start: 2023-02-27 | End: 2023-05-30

## 2023-02-27 RX ORDER — ROSUVASTATIN CALCIUM 40 MG/1
40 TABLET, COATED ORAL DAILY
Qty: 90 TABLET | Refills: 3 | Status: SHIPPED | OUTPATIENT
Start: 2023-02-27 | End: 2024-01-19

## 2023-02-27 RX ORDER — LISINOPRIL 20 MG/1
20 TABLET ORAL 2 TIMES DAILY
Qty: 180 TABLET | Refills: 3 | Status: SHIPPED | OUTPATIENT
Start: 2023-02-27 | End: 2024-01-19

## 2023-02-27 ASSESSMENT — PAIN SCALES - GENERAL: PAINLEVEL: NO PAIN (0)

## 2023-02-27 ASSESSMENT — PATIENT HEALTH QUESTIONNAIRE - PHQ9: SUM OF ALL RESPONSES TO PHQ QUESTIONS 1-9: 0

## 2023-02-27 NOTE — PROGRESS NOTES
SUBJECTIVE:   CC: Chacorta is an 47 year old who presents for preventative health visit.     The patient is doing very well and works out regularly but his weight is a bit of an issue.  His depression and anxiety are managed by psychiatry and he is doing super.  He [facility] regularly for his head neck cancer and is going down there soon and to have a CT.    He is  and has 3 kids ages 14, 12 and 9.  He is going to have a bar aramis in November, his son.  He is at [facility] [employer]      Patient has been advised of split billing requirements and indicates understanding: Yes     Healthy Habits:     Getting at least 3 servings of Calcium per day:  Yes    Bi-annual eye exam:  NO    Dental care twice a year:  Yes    Sleep apnea or symptoms of sleep apnea:  None    Diet:  Low fat/cholesterol    Frequency of exercise:  4-5 days/week    Duration of exercise:  30-45 minutes    Taking medications regularly:  Yes    Medication side effects:  Not applicable    PHQ-2 Total Score: 0    Additional concerns today:  No            Today's PHQ-2 Score:   PHQ-2 ( 1999 [employer]) 2/21/2023   Q1: Little interest or pleasure in doing things 0   Q2: Feeling down, depressed or hopeless 0   PHQ-2 Score 0   PHQ-2 Total Score (12-17 Years)- Positive if 3 or more points; Administer PHQ-A if positive -   Q1: Little interest or pleasure in doing things Not at all   Q2: Feeling down, depressed or hopeless Not at all   PHQ-2 Score 0       Have you ever done Advance Care Planning? (For example, a Health Directive, POLST, or a discussion with a medical provider or your loved ones about your wishes): Yes, patient states has an Advance Care Planning document and will bring a copy to the clinic.   .             Past Medical History:      Past Medical History:   Diagnosis Date     Anemia 2019    nl iron, folic acid, spep, b12, haptoglobin, seen by heme and back to [place], no fu needed     Anxiety and depression 2004    Dr. Nicole Soler     ASCVD (arteriosclerotic  cardiovascular disease) 06/2019    stemi, emergent angio done with 90% mid rca very large vessel, ptca and stented, 2nd marginal 30%, ostial 30%, mid lad 50% sequential lesions, nl ef, possible outflow obstruction on echo     Hypertension 05/2019     Tongue cancer (H) 05/2021    resected at [place]             Past Surgical History:      Past Surgical History:   Procedure Laterality Date     BIOPSY       CV CORONARY ANGIOGRAM N/A 5/31/2019    Procedure: Coronary Angiogram;  Surgeon: Baltazar Gómez MD;  Location:  HEART CARDIAC CATH LAB     CV LEFT HEART CATH N/A 5/31/2019    Procedure: Left Heart Cath;  Surgeon: Baltazar Gómez MD;  Location:  HEART CARDIAC CATH LAB     CV LEFT VENTRICULOGRAM N/A 5/31/2019    Procedure: Left Ventriculogram;  Surgeon: Baltazar Gómez MD;  Location: [facility] CARDIAC CATH LAB     CV PCI STENT DRUG ELUTING N/A 5/31/2019    Procedure: PCI Stent Drug Eluting;  Surgeon: aBltazar Gómez MD;  Location: [facility] CARDIAC CATH LAB     HEAD & NECK SURGERY  5/3/21     VASCULAR SURGERY  May 2019    Stent     wisdom teeth removed  college             Social History:     Social History     Socioeconomic History     Marital status:      Spouse name: Not on file     Number of children: 3     Years of education: Not on file     Highest education level: Not on file   Occupational History     Occupation:    Tobacco Use     Smoking status: Never     Smokeless tobacco: Never   Substance and Sexual Activity     Alcohol use: Yes     Comment: occasional     Drug use: No     Sexual activity: Yes     Partners: Female     Birth control/protection: I.U.D.   Other Topics Concern     Parent/sibling w/ CABG, MI or angioplasty before 65F 55M? No   Social History Narrative     Not on file     Social Determinants of Health     Financial Resource Strain: Not on file   Food Insecurity: Not on file   Transportation Needs: Not on file   Physical Activity: Not on file   Stress: Not on  "file   Social Connections: Not on file   Intimate Partner Violence: Not on file   Housing Stability: Not on file             Family History:   reviewed         Allergies:   No Known Allergies          Medications:     Current Outpatient Medications   Medication Sig Dispense Refill     aspirin (ASA) 81 MG EC tablet Take 1 tablet (81 mg) by mouth daily       carvedilol (COREG) 12.5 MG tablet Take 1.5 tablets (18.75 mg) by mouth 2 times daily 270 tablet 0     escitalopram (LEXAPRO) 20 MG tablet Take 1 tablet (20 mg) by mouth daily       lisinopril (ZESTRIL) 20 MG tablet Take 1 tablet (20 mg) by mouth 2 times daily 180 tablet 3     rosuvastatin (CRESTOR) 40 MG tablet Take 1 tablet (40 mg) by mouth daily 90 tablet 3     nitroGLYcerin (NITROSTAT) 0.4 MG sublingual tablet For chest pain place 1 tablet under the tongue every 5 minutes for 3 doses. If symptoms persist 5 minutes after 1st dose call 911. (Patient not taking: Reported on 2/27/2023) 25 tablet 0               Review of Systems:   The 10 point Review of Systems is negative other than noted in the HPI           Physical Exam:   Blood pressure 131/88, pulse 56, resp. rate 16, height 1.791 m (5' 10.51\"), weight 103.9 kg (229 lb), SpO2 100 %.    Exam:  Constitutional: healthy appearing, alert and in no distress  Heent: Normocephalic. Head without obvious masses or lesions. PERRLDC, EOMI. Mouth exam within normal limits: tongue, mucous membranes, posterior pharynx all normal, no lesions or abnormalities seen.  Tm's and canals within normal limits bilaterally. Neck supple, no nuchal rigidity or masses. No supraclavicular, or cervical adenopathy. Thyroid symmetric, no masses.  Cardiovascular: Regular rate and rhythm, no murmer, rub or gallops.  JVP not elevated, no edema.  Carotids within normal limits bilaterally, no bruits.  Respiratory: Normal respiratory effort.  Lungs clear, normal flow, no wheezing or crackles.  Breasts: Normal bilaterally.  No masses or lesions.  " Nipples within normal limites.  No axillary lesions or nodes.  Gastrointestinal: Normal active bowel sounds.   Soft, not tender, no masses, guarding or rebound.  No hepatosplenomegaly.   Genitourinary: Rectal not done  Musculoskeletal: extremities normal, no gross deformities noted.  Skin: no suspicious lesions or rashes   Neurologic: Mental status within normal limits.  Speech fluent.  No gross motor abnormalities and gait intact.  Psychiatric: mentation appears normal and affect normal.         Data:   Labs sent        Assessment:   1. Normal complete physical exam  2. Cad, stable, med mgmt  3. Tongue ca, siva, follow up [facility]  4. Anxiety and depression, doing well  5. Mild lv outflow obstruction on 2019 echo, I discussed this with the patient and he will follow-up with cardiology for it.         Plan:   Up to date immunizations  Routine colon exam  Exercise, diet, weight loss  Letter with labs  Follow up cardiology and [facility]      Hamilton Edge M.D.

## 2023-02-28 NOTE — RESULT ENCOUNTER NOTE
Chacorta,    It was very nice to see you for your physical.  You should be able to view your test results which look super.    Your CBC or complete blood count is normal with no signs of anemia or blood disorders.    Your chemistry panel shows no diabetes.  The sugar is on the upper end of normal so please be sure to exercise and try to get your weight down for this.  Your blood salts, kidney test, liver test, and proteins are normal.    Your total cholesterol is super.  Your HDL or good cholesterol and LDL or bad cholesterol are also excellent.    Please let me know if you have any questions.    Hamilton

## 2023-03-09 ENCOUNTER — TELEPHONE (OUTPATIENT)
Dept: GASTROENTEROLOGY | Facility: CLINIC | Age: 48
End: 2023-03-09
Payer: COMMERCIAL

## 2023-03-09 NOTE — TELEPHONE ENCOUNTER
Screening Questions  BLUE  KIND OF PREP RED  LOCATION [review exclusion criteria] GREEN  SEDATION TYPE        y Are you active on mychart?       [place] Ordering/Referring Provider?        BCBS What type of coverage do you have?      n Have you had a positive covid test in the last 14 days?     31.4 1. BMI  [BMI 40+ - review exclusion criteria]    y  2. Are you able to give consent for your medical care? [IF NO,RN REVIEW]          n  3. Are you taking any prescription pain medications on a routine schedule   (ex narcotics: oxycodone, roxicodone, oxycontin,  and percocet)? [RN Review]        n  3a. EXTENDED PREP What kind of prescription?     n 4. Do you have any chemical dependencies such as alcohol, street drugs, or methadone?        **If yes 3- 5 , please schedule with MAC sedation.**          IF YES TO ANY 6 - 10 - HOSPITAL SETTING ONLY.     n 6.   Do you need assistance transferring?     n 7.   Have you had a heart or lung transplant?    n 8.   Are you currently on dialysis?   n 9.   Do you use daily home oxygen?   n 10. Do you take nitroglycerin?   10a. n If yes, how often?     11. [FEMALES]   Are you currently pregnant?    11a.  If yes, how many weeks? [ Greater than 12 weeks, OR NEEDED]    n 12. Do you have Pulmonary Hypertension? *NEED PAC APPT AT [facility] w/ MAC*     n 13. [review exclusion criteria]  Do you have any implantable devices in your body (pacemaker, defib, LVAD)?    n 14. In the past 6 months, have you had any heart related issues including cardiomyopathy or heart attack?     14a. n If yes, did it require cardiac stenting if so when?     n 15. Have you had a stroke or Transient ischemic attack (TIA - aka  mini stroke ) within 6 months?      n 16. Do you have mod to severe Obstructive Sleep Apnea?  [Hospital only]    n 17. Do you have SEVERE AND UNCONTROLLED asthma? *NEED PAC APPT AT [facility] w/MAC*     18. Are you currently taking any blood thinners?     18a. No. Continue to 19.   18b.     n 19. Do  "you take the medication Phentermine?    19a. If yes, \"Hold for 7 days before procedure.  Please consult your prescribing provider if you have questions about holding this medication.\"     n  20. Do you have chronic kidney disease?      n  21. Do you have a diagnosis of diabetes?     n  22. On a regular basis do you go 3-5 days between bowel movements?      23. Preferred LOCAL Pharmacy for Pre Prescription    [ LIST ONLY ONE PHARMACY]          [facility] [id number] [place] [facility] - [address]         - CLOSING REMINDERS -    Informed patient they will need an adult    Cannot take any type of public or medical transportation alone    Conscious Sedation- Needs  for 6 hours after the procedure       MAC/General-Needs  for 24 hours after procedure    Pre-Procedure Covid test to be completed [[facility] PCR Testing Required]    Confirmed Nurse will call to complete assessment       - SCHEDULING DETAILS -  n Hospital Setting Required? If yes, what is the exclusion?:    Sj  Surgeon    7/27/23  Date of Procedure  Lower Endoscopy [Colonoscopy]  [facility]   STANDARD GOLYTELY-If you answer yes to questions #8, #20, #21Which Colonoscopy Prep was Sent?     MAC Sedation Type     y PAC / Pre-op Required                 "

## 2023-05-28 DIAGNOSIS — I25.10 ASCVD (ARTERIOSCLEROTIC CARDIOVASCULAR DISEASE): ICD-10-CM

## 2023-05-30 RX ORDER — CARVEDILOL 12.5 MG/1
18.75 TABLET ORAL 2 TIMES DAILY
Qty: 270 TABLET | Refills: 2 | Status: SHIPPED | OUTPATIENT
Start: 2023-05-30 | End: 2024-01-19

## 2023-07-21 RX ORDER — BISACODYL 5 MG/1
TABLET, DELAYED RELEASE ORAL
Qty: 4 TABLET | Refills: 0 | Status: SHIPPED | OUTPATIENT
Start: 2023-07-21 | End: 2023-09-15

## 2023-07-27 ENCOUNTER — HOSPITAL ENCOUNTER (OUTPATIENT)
Facility: CLINIC | Age: 48
Discharge: HOME | End: 2023-07-27
Attending: COLON & RECTAL SURGERY | Admitting: COLON & RECTAL SURGERY
Payer: COMMERCIAL

## 2023-07-27 VITALS
RESPIRATION RATE: 22 BRPM | OXYGEN SATURATION: 97 % | DIASTOLIC BLOOD PRESSURE: 99 MMHG | SYSTOLIC BLOOD PRESSURE: 134 MMHG | HEART RATE: 65 BPM

## 2023-07-27 DIAGNOSIS — Z12.11 ENCOUNTER FOR SCREENING COLONOSCOPY: Primary | ICD-10-CM

## 2023-07-27 LAB — COLONOSCOPY: NORMAL

## 2023-07-27 PROCEDURE — G0500 MOD SEDAT ENDO SERVICE >5YRS: HCPCS | Performed by: COLON & RECTAL SURGERY

## 2023-07-27 PROCEDURE — 45378 DIAGNOSTIC COLONOSCOPY: CPT | Performed by: COLON & RECTAL SURGERY

## 2023-07-27 PROCEDURE — 250N000011 HC RX IP 250 OP 636: Performed by: COLON & RECTAL SURGERY

## 2023-07-27 PROCEDURE — G0121 COLON CA SCRN NOT HI RSK IND: HCPCS | Performed by: COLON & RECTAL SURGERY

## 2023-07-27 RX ORDER — ONDANSETRON 2 MG/ML
4 INJECTION INTRAMUSCULAR; INTRAVENOUS
Status: DISCONTINUED | OUTPATIENT
Start: 2023-07-27 | End: 2023-07-27 | Stop reason: HOSPADM

## 2023-07-27 RX ORDER — FENTANYL CITRATE 50 UG/ML
INJECTION, SOLUTION INTRAMUSCULAR; INTRAVENOUS PRN
Status: DISCONTINUED | OUTPATIENT
Start: 2023-07-27 | End: 2023-07-27 | Stop reason: HOSPADM

## 2023-07-27 RX ORDER — LIDOCAINE 40 MG/G
CREAM TOPICAL
Status: DISCONTINUED | OUTPATIENT
Start: 2023-07-27 | End: 2023-07-27 | Stop reason: HOSPADM

## 2023-07-27 ASSESSMENT — ACTIVITIES OF DAILY LIVING (ADL): ADLS_ACUITY_SCORE: 33

## 2023-07-27 NOTE — H&P
Colon & Rectal Surgery History and Physical  Pre-Endoscopy Procedure Note    History of Present Illness   I have been asked by Dr. Edge to evaluate this 47 year old male for colorectal cancer screening. He currently denies any abdominal pain, weight loss, bleeding per rectum, or recent change in bowel habits.    Past Medical History  Diagnosis Date    Anemia 2019    Anxiety and depression 2004    ASCVD (arteriosclerotic cardiovascular disease) 06/2019    stemi, emergent angio done with 90% mid rca very large vessel, ptca and stented, 2nd marginal 30%, ostial 30%, mid lad 50% sequential lesions, nl ef, possible outflow obstruction on echo    Hypertension 05/2019    Tongue cancer 05/2021    resected at [facility]       Past Surgical History  Procedure Laterality Date    BIOPSY      CV CORONARY ANGIOGRAM N/A 5/31/2019    Procedure: Coronary Angiogram;  Surgeon: Baltazar Gómez MD;  Location:  HEART CARDIAC CATH LAB    CV LEFT HEART CATH N/A 5/31/2019    Procedure: Left Heart Cath;  Surgeon: Baltazar Gómez MD;  Location:  HEART CARDIAC CATH LAB    CV LEFT VENTRICULOGRAM N/A 5/31/2019    Procedure: Left Ventriculogram;  Surgeon: Baltazar óGmez MD;  Location:  HEART CARDIAC CATH LAB    CV PCI STENT DRUG ELUTING N/A 5/31/2019    Procedure: PCI Stent Drug Eluting;  Surgeon: Baltazar Gómez MD;  Location:  HEART CARDIAC CATH LAB    HEAD & NECK SURGERY  5/3/21    VASCULAR SURGERY  May 2019    Stent    [employer] teeth removal  col[employer]        Medications  Medications Prior to Admission   Medication Sig    aspirin (ASA) 81 MG EC tablet Take 1 tablet (81 mg) by mouth daily    carvedilol (COREG) 12.5 MG tablet TAKE 1.5 TABLETS (18.75 MG) BY MOUTH 2 TIMES DAILY    escitalopram (LEXAPRO) 20 MG tablet Take 1 tablet (20 mg) by mouth daily    lisinopril (ZESTRIL) 20 MG tablet Take 1 tablet (20 mg) by mouth 2 times daily    rosuvastatin (CRESTOR) 40 MG tablet Take 1 tablet (40 mg) by mouth daily    nitroGLYcerin  (NITROSTAT) 0.4 MG sublingual tablet For chest pain place 1 tablet under the tongue every 5 minutes for 3 doses. If symptoms persist 5 minutes after 1st dose call 911. (Patient not taking: Reported on 2/27/2023)       Allergies   No Known Allergies     Family History   Family history includes Hypertension in his father; Other Cancer in his father and mother.     Social History   He reports that he has never smoked. He has never used smokeless tobacco. He reports current alcohol use. He reports that he does not use drugs.    Review of Systems   Constitutional:  No fever, weight change or fatigue.    Eyes:     No dry eyes or vision changes.   Ears/Nose/Throat/Neck:  No oral ulcers, sore throat or voice change.    Cardiovascular:   No palpitations, syncope, angina or edema.   Respiratory:    No chest pain, excessive sleepiness, shortness of breath or hemoptysis.    Gastrointestinal:   No abdominal pain, nausea, vomiting, diarrhea or heartburn.    Genitourinary:   No dysuria, hematuria, urinary retention or urinary frequency.   Musculoskeletal:  No joint swelling or arthralgias.    Dermatologic:  No skin rash or other skin changes.   Neurologic:    No focal weakness or numbness. No neuropathy.   Psychiatric:    No depression, anxiety, suicidal ideation, or paranoid ideation.   Endocrine:   No cold or heat intolerance, polydipsia, hirsutism, change in libido, or flushing.   Hematology/Lymphatic:  No bleeding or lymphadenopathy.    Allergy/Immunology:  No rhinitis or hives.     Physical Exam   Vitals:  /98, HR 71, RR 16, SpO2 99 %.    General:  Alert and oriented to person, place and time   Airway: Normal oropharyngeal airway and neck mobility   Lungs:  Clear bilaterally   Heart:  Regular rate and rhythm   Abdomen: Soft, NT, ND, no masses   Extremities: Warm, good capillary refill    ASA Grade: II (mild systemic disease)    Impression: Cleared for use of conscious sedation for colorectal cancer screening    Plan:  Proceed with colonoscopy     Karina Gustafson MD  [facility]  [phone]

## 2023-09-15 ENCOUNTER — OFFICE VISIT (OUTPATIENT)
Dept: CARDIOLOGY | Facility: CLINIC | Age: 48
End: 2023-09-15
Payer: COMMERCIAL

## 2023-09-15 VITALS
HEART RATE: 63 BPM | WEIGHT: 230 LBS | DIASTOLIC BLOOD PRESSURE: 80 MMHG | HEIGHT: 70 IN | BODY MASS INDEX: 32.93 KG/M2 | SYSTOLIC BLOOD PRESSURE: 118 MMHG | OXYGEN SATURATION: 97 %

## 2023-09-15 DIAGNOSIS — I21.11 STEMI INVOLVING RIGHT CORONARY ARTERY (H): ICD-10-CM

## 2023-09-15 DIAGNOSIS — C02.9 TONGUE CANCER (H): ICD-10-CM

## 2023-09-15 PROCEDURE — 99214 OFFICE O/P EST MOD 30 MIN: CPT | Performed by: INTERNAL MEDICINE

## 2023-09-15 NOTE — PROGRESS NOTES
"Cardiology Progress Note          Assessment and Plan:       Coronary artery disease with history of PCI to the RCA 2019  Weight loss attempts in future.  Patient is 230 pounds currently, continue current cardiovascular medications and follow-up in 2 years.    30 minutes was spent with the patient, precharting and reviewing tests as well as post visit charting all done today..    This note was transcribed using electronic voice recognition software and there may be typographical errors present.                    Interval History:     The patient is a very pleasant 47 year old [employer] educated  with history of right tongue cancer status post resection and coronary artery disease status post PCI of the RCA 2019.  He remains free of exertional symptoms.    LDL has crept up with his weight gain.  He was 209 pounds previously now 230 pounds.  We discussed calorie restriction and preventing further weight gain.                     Review of Systems:     Review of Systems:  Skin:        Eyes:       ENT:       Respiratory:       Cardiovascular:       Gastroenterology:      Genitourinary:       Musculoskeletal:       Neurologic:       Psychiatric:       Heme/Lymph/Imm:       Endocrine:                   Physical Exam:     Vitals: /80 (BP Location: Right arm, Patient Position: Sitting, Cuff Size: Adult Large)   Pulse 63   Ht 1.778 m (5' 10\")   Wt 104.3 kg (230 lb)   SpO2 97%   BMI 33.00 kg/m    Constitutional:  cooperative, alert and oriented, well developed, well nourished, in no acute distress        Skin:  warm and dry to the touch, no apparent skin lesions or masses noted        Head:  normocephalic, no masses or lesions        Eyes:  pupils equal and round, conjunctivae and lids unremarkable, sclera white, no xanthalasma, EOMS intact, no nystagmus        Chest:  normal symmetry        Cardiac: normal S1 and S2;no murmurs, gallops or rubs detected                  Extremities and Back:  no " deformities, clubbing, cyanosis, erythema observed        Neurological:  no gross motor deficits;affect appropriate                 Medications:     Current Outpatient Medications   Medication Sig Dispense Refill    aspirin (ASA) 81 MG EC tablet Take 1 tablet (81 mg) by mouth daily      carvedilol (COREG) 12.5 MG tablet TAKE 1.5 TABLETS (18.75 MG) BY MOUTH 2 TIMES DAILY 270 tablet 2    escitalopram (LEXAPRO) 20 MG tablet Take 1 tablet (20 mg) by mouth daily      lisinopril (ZESTRIL) 20 MG tablet Take 1 tablet (20 mg) by mouth 2 times daily 180 tablet 3    nitroGLYcerin (NITROSTAT) 0.4 MG sublingual tablet For chest pain place 1 tablet under the tongue every 5 minutes for 3 doses. If symptoms persist 5 minutes after 1st dose call 911. 25 tablet 0    rosuvastatin (CRESTOR) 40 MG tablet Take 1 tablet (40 mg) by mouth daily 90 tablet 3                Data:   All laboratory data reviewed  No results found for this or any previous visit (from the past 24 hour(s)).    All laboratory data reviewed  Lab Results   Component Value Date    CHOL 122 02/27/2023    CHOL 89 09/04/2020     Lab Results   Component Value Date    HDL 48 02/27/2023    HDL 50 09/04/2020     Lab Results   Component Value Date    LDL 58 02/27/2023    LDL 26 09/04/2020     Lab Results   Component Value Date    TRIG 79 02/27/2023    TRIG 64 09/04/2020     Lab Results   Component Value Date    CHOLHDLRATIO 3.9 01/13/2015     TSH   Date Value Ref Range Status   12/12/2019 1.52 0.40 - 4.00 mU/L Final     Last Basic Metabolic Panel:  Lab Results   Component Value Date     02/27/2023     09/04/2020      Lab Results   Component Value Date    POTASSIUM 4.7 02/27/2023    POTASSIUM 4.3 09/09/2021    POTASSIUM 4.3 09/04/2020     Lab Results   Component Value Date    CHLORIDE 104 02/27/2023    CHLORIDE 107 09/09/2021    CHLORIDE 107 09/04/2020     Lab Results   Component Value Date    NICOLE 9.6 02/27/2023    NICOLE 9.1 09/04/2020     Lab Results   Component  Value Date    CO2 26 02/27/2023    CO2 27 09/09/2021    CO2 26 09/04/2020     Lab Results   Component Value Date    BUN 19.1 02/27/2023    BUN 23 09/09/2021    BUN 23 09/04/2020     Lab Results   Component Value Date    CR 0.92 02/27/2023    CR 0.96 09/04/2020     Lab Results   Component Value Date     02/27/2023    GLC 99 09/09/2021    GLC 90 09/04/2020     Lab Results   Component Value Date    WBC 6.0 02/27/2023    WBC 4.0 09/04/2020     Lab Results   Component Value Date    RBC 4.72 02/27/2023    RBC 4.41 09/04/2020     Lab Results   Component Value Date    HGB 14.1 02/27/2023    HGB 13.6 09/04/2020     Lab Results   Component Value Date    HCT 44.3 02/27/2023    HCT 39.1 09/04/2020     Lab Results   Component Value Date    MCV 94 02/27/2023    MCV 89 09/04/2020     Lab Results   Component Value Date    MCH 29.9 02/27/2023    MCH 30.8 09/04/2020     Lab Results   Component Value Date    MCHC 31.8 02/27/2023    MCHC 34.8 09/04/2020     Lab Results   Component Value Date    RDW 11.9 02/27/2023    RDW 12.1 09/04/2020     Lab Results   Component Value Date     02/27/2023     09/04/2020

## 2023-09-15 NOTE — LETTER
"9/15/2023    Hamilton Edge MD  [address]  [place] [address]    RE: Nathan Willis       Dear Colleague,     I had the [facility] of seeing Nathan Willis in [facility].  Cardiology Progress Note          Assessment and Plan:       Coronary artery disease with history of PCI to the RCA 2019  Weight loss attempts in future.  Patient is 230 pounds currently, continue current cardiovascular medications and follow-up in 2 years.    30 minutes was spent with the patient, precharting and reviewing tests as well as post visit charting all done today..    This note was transcribed using electronic voice recognition software and there may be typographical errors present.                    Interval History:     The patient is a very pleasant 47 year old [employer] educated  with history of right tongue cancer status post resection and coronary artery disease status post PCI of the RCA 2019.  He remains free of exertional symptoms.    LDL has crept up with his weight gain.  He was 209 pounds previously now 230 pounds.  We discussed calorie restriction and preventing further weight gain.                     Review of Systems:     Review of Systems:  Skin:        Eyes:       ENT:       Respiratory:       Cardiovascular:       Gastroenterology:      Genitourinary:       Musculoskeletal:       Neurologic:       Psychiatric:       Heme/Lymph/Imm:       Endocrine:                   Physical Exam:     Vitals: /80 (BP Location: Right arm, Patient Position: Sitting, Cuff Size: Adult Large)   Pulse 63   Ht 1.778 m (5' 10\")   Wt 104.3 kg (230 lb)   SpO2 97%   BMI 33.00 kg/m    Constitutional:  cooperative, alert and oriented, well developed, well nourished, in no acute distress        Skin:  warm and dry to the touch, no apparent skin lesions or masses noted        Head:  normocephalic, no masses or lesions        Eyes:  pupils equal and round, conjunctivae and lids " unremarkable, sclera white, no xanthalasma, EOMS intact, no nystagmus        Chest:  normal symmetry        Cardiac: normal S1 and S2;no murmurs, gallops or rubs detected                  Extremities and Back:  no deformities, clubbing, cyanosis, erythema observed        Neurological:  no gross motor deficits;affect appropriate                 Medications:     Current Outpatient Medications   Medication Sig Dispense Refill    aspirin (ASA) 81 MG EC tablet Take 1 tablet (81 mg) by mouth daily      carvedilol (COREG) 12.5 MG tablet TAKE 1.5 TABLETS (18.75 MG) BY MOUTH 2 TIMES DAILY 270 tablet 2    escitalopram (LEXAPRO) 20 MG tablet Take 1 tablet (20 mg) by mouth daily      lisinopril (ZESTRIL) 20 MG tablet Take 1 tablet (20 mg) by mouth 2 times daily 180 tablet 3    nitroGLYcerin (NITROSTAT) 0.4 MG sublingual tablet For chest pain place 1 tablet under the tongue every 5 minutes for 3 doses. If symptoms persist 5 minutes after 1st dose call 911. 25 tablet 0    rosuvastatin (CRESTOR) 40 MG tablet Take 1 tablet (40 mg) by mouth daily 90 tablet 3                Data:   All laboratory data reviewed  No results found for this or any previous visit (from the past 24 hour(s)).    All laboratory data reviewed  Lab Results   Component Value Date    CHOL 122 02/27/2023    CHOL 89 09/04/2020     Lab Results   Component Value Date    HDL 48 02/27/2023    HDL 50 09/04/2020     Lab Results   Component Value Date    LDL 58 02/27/2023    LDL 26 09/04/2020     Lab Results   Component Value Date    TRIG 79 02/27/2023    TRIG 64 09/04/2020     Lab Results   Component Value Date    CHOLHDLRATIO 3.9 01/13/2015     TSH   Date Value Ref Range Status   12/12/2019 1.52 0.40 - 4.00 mU/L Final     Last Basic Metabolic Panel:  Lab Results   Component Value Date     02/27/2023     09/04/2020      Lab Results   Component Value Date    POTASSIUM 4.7 02/27/2023    POTASSIUM 4.3 09/09/2021    POTASSIUM 4.3 09/04/2020     Lab Results    Component Value Date    CHLORIDE 104 02/27/2023    CHLORIDE 107 09/09/2021    CHLORIDE 107 09/04/2020     Lab Results   Component Value Date    NICOLE 9.6 02/27/2023    NICOLE 9.1 09/04/2020     Lab Results   Component Value Date    CO2 26 02/27/2023    CO2 27 09/09/2021    CO2 26 09/04/2020     Lab Results   Component Value Date    BUN 19.1 02/27/2023    BUN 23 09/09/2021    BUN 23 09/04/2020     Lab Results   Component Value Date    CR 0.92 02/27/2023    CR 0.96 09/04/2020     Lab Results   Component Value Date     02/27/2023    GLC 99 09/09/2021    GLC 90 09/04/2020     Lab Results   Component Value Date    WBC 6.0 02/27/2023    WBC 4.0 09/04/2020     Lab Results   Component Value Date    RBC 4.72 02/27/2023    RBC 4.41 09/04/2020     Lab Results   Component Value Date    HGB 14.1 02/27/2023    HGB 13.6 09/04/2020     Lab Results   Component Value Date    HCT 44.3 02/27/2023    HCT 39.1 09/04/2020     Lab Results   Component Value Date    MCV 94 02/27/2023    MCV 89 09/04/2020     Lab Results   Component Value Date    MCH 29.9 02/27/2023    MCH 30.8 09/04/2020     Lab Results   Component Value Date    MCHC 31.8 02/27/2023    MCHC 34.8 09/04/2020     Lab Results   Component Value Date    RDW 11.9 02/27/2023    RDW 12.1 09/04/2020     Lab Results   Component Value Date     02/27/2023     09/04/2020                 Thank you for allowing me to participate in the care of your patient.      Sincerely,     Taye Pittman MD     [facility]  cc:   Taye Pittman MD  [address]  [place] [address]

## 2024-01-18 ASSESSMENT — ENCOUNTER SYMPTOMS
HEMATURIA: 0
CONSTIPATION: 0
SHORTNESS OF BREATH: 0
COUGH: 0
FEVER: 0
DIZZINESS: 0
FREQUENCY: 0
DIARRHEA: 0
NERVOUS/ANXIOUS: 0
PALPITATIONS: 0
PARESTHESIAS: 0
DYSURIA: 0
NAUSEA: 0
JOINT SWELLING: 0
WEAKNESS: 0
HEMATOCHEZIA: 0
HEADACHES: 0
MYALGIAS: 0
ABDOMINAL PAIN: 0
CHILLS: 0
HEARTBURN: 0
ARTHRALGIAS: 0
SORE THROAT: 0
EYE PAIN: 0

## 2024-01-18 ASSESSMENT — PATIENT HEALTH QUESTIONNAIRE - PHQ9
10. IF YOU CHECKED OFF ANY PROBLEMS, HOW DIFFICULT HAVE THESE PROBLEMS MADE IT FOR YOU TO DO YOUR WORK, TAKE CARE OF THINGS AT HOME, OR GET ALONG WITH OTHER PEOPLE: NOT DIFFICULT AT ALL
SUM OF ALL RESPONSES TO PHQ QUESTIONS 1-9: 0
SUM OF ALL RESPONSES TO PHQ QUESTIONS 1-9: 0

## 2024-01-18 NOTE — COMMUNITY RESOURCES LIST (ENGLISH)
01/18/2024   [facility]  N/A  For questions about this resource list or additional care needs, please contact your primary care clinic or care manager.  Phone: [phone]   Email: N/A   Address: [facility] [place] [address]   Hours: N/A        Hotlines and Helplines       Hotline - Housing crisis  1  [facility] Distance: 8.36 miles      Phone/Virtual   [address] [place] [address]  Language: English  Hours: Mon - Sun Open 24 Hours   Phone: (681) 914-8411 Email: info@[employer].groSolar Website: http://www.[address]     2  [facility] Distance: 9.62 miles      Phone/Virtual   [address] [place] [address]  Language: English  Hours: Mon - Sun Open 24 Hours  Fees: Free   Phone: (404) 101-2271 Email: info@saintolaf.org Website: http://www.saintolaf.org/          Housing       Coordinated Entry access point  3  [facility] Distance: 8.94 miles      In-Person, Phone/Virtual   [address] [place] [address]  Language: English, Frisian  Hours: Mon - Fri 10:00 AM - 5:30 PM , Mon - Sun 7:30 PM - 10:20 PM , Sat - Sun 1:00 PM - 5:30 PM  Fees: Free   Phone: (280) 328-3958 Email: info@[employer].groSolar Website: https://www.[employer].org/our-programs/adult-shelter-connect-[place]/     4  [facility] - Adult longterm [facility] Distance: 9.62 miles      In-Person   [address] [place] [address]  Language: English  Hours: Mon - Sat 10:00 PM - 5:00 PM  Fees: Free   Phone: (211) 675-5063 Email: info@saintolaf.groSolar Website: http://www.saintolaf.org/     Drop-in center or day shelter  5  [facility] Distance: 9.11 miles      In-Person   [address] [place] [address]  Language: English, Hmong, Taiwanese, Frisian  Hours: Mon - Thu 8:30 AM - 4:30 PM , Sat - Sun 9:00 AM - 12:00 PM  Fees: Free   Phone: (754) 699-6373 Email:  info@[employer].org Website: https://[employer].org/     6  [facility] Distance: 9.68 miles      In-Person   [address] [place] [address]  Language: English  Hours: Mon - Fri 12:00 PM - 3:00 PM  Fees: Free   Phone: (159) 476-4449 Email: [facility]@Medstro.Expedit.us Website: http://Swipesense.org/     Housing search assistance  7  [facility] Distance: 3.61 miles      In-Person, Phone/Virtual   [address] [place] [address]  Language: English, Congolese, St Helenian  Hours: Mon - Fri 8:30 AM - 4:30 PM  Fees: Free   Phone: (991) 419-8855 Email: jama@resourcewest.org Website: http://www.resourcewest.org/     8  [facility] Distance: 5.71 miles      In-Person   [address] [facility] [address] [place] [address]  Language: English  Hours: Mon - Fri 8:00 AM - 5:00 PM  Fees: Insurance, Self Pay   Phone: (260) 873-2482 Email: Rk@[employer] Website: http://www.[employer]enity/     Shelter for individuals  9  [facility] Distance: 8.92 miles      In-Person   [address] [place] [address]  Language: English  Hours: Mon - Sun 5:00 PM - 10:00 AM  Fees: Free, Self Pay   Phone: (324) 846-2517 Email: info@[employer].org Website: https://www.[employer].org/locations/higher-ground-shelter/     10  [facility] Distance: 9.19 miles      In-Person   [address] [place] [address]  Language: English  Hours: Mon - Fri 4:00 PM - 9:00 AM  Fees: Free   Phone: (370) 285-7635 Email: nicolle@[facility] Hospital[facility].org Website: https://central[facility].org/northern/[facility]/          Important Numbers & Websites       Emergency Services   911  [facility]   [facility]  Poison Control   (866) 825-2563  Suicide Prevention Lifeline   (453) 571-6561 (TALK)  Child Abuse Hotline   (603) 562-2869 (4-A-Child)  Sexual Assault Hotline   (741) 878-2561  (HOPE)  National Runaway Safeline   (136) 444-3556 (RUNAWAY)  All-Options Talkline   (688) 634-3466  Substance Abuse Referral   (563) 285-8433 (HELP)

## 2024-01-19 ENCOUNTER — OFFICE VISIT (OUTPATIENT)
Dept: FAMILY MEDICINE | Facility: CLINIC | Age: 49
End: 2024-01-19
Payer: COMMERCIAL

## 2024-01-19 VITALS
HEIGHT: 70 IN | HEART RATE: 64 BPM | TEMPERATURE: 97.7 F | OXYGEN SATURATION: 98 % | BODY MASS INDEX: 32.93 KG/M2 | WEIGHT: 230 LBS | RESPIRATION RATE: 16 BRPM | SYSTOLIC BLOOD PRESSURE: 134 MMHG | DIASTOLIC BLOOD PRESSURE: 88 MMHG

## 2024-01-19 DIAGNOSIS — I25.10 ASCVD (ARTERIOSCLEROTIC CARDIOVASCULAR DISEASE): ICD-10-CM

## 2024-01-19 DIAGNOSIS — F32.A ANXIETY AND DEPRESSION: ICD-10-CM

## 2024-01-19 DIAGNOSIS — Z00.00 ENCOUNTER FOR ANNUAL PHYSICAL EXAM: Primary | ICD-10-CM

## 2024-01-19 DIAGNOSIS — F41.9 ANXIETY AND DEPRESSION: ICD-10-CM

## 2024-01-19 DIAGNOSIS — D64.9 ANEMIA, UNSPECIFIED TYPE: ICD-10-CM

## 2024-01-19 DIAGNOSIS — C02.9 TONGUE CANCER (H): ICD-10-CM

## 2024-01-19 LAB
ERYTHROCYTE [DISTWIDTH] IN BLOOD BY AUTOMATED COUNT: 11.5 % (ref 10–15)
HCT VFR BLD AUTO: 43 % (ref 40–53)
HGB BLD-MCNC: 14.7 G/DL (ref 13.3–17.7)
MCH RBC QN AUTO: 29.3 PG (ref 26.5–33)
MCHC RBC AUTO-ENTMCNC: 34.2 G/DL (ref 31.5–36.5)
MCV RBC AUTO: 86 FL (ref 78–100)
PLATELET # BLD AUTO: 208 10E3/UL (ref 150–450)
RBC # BLD AUTO: 5.01 10E6/UL (ref 4.4–5.9)
WBC # BLD AUTO: 4.5 10E3/UL (ref 4–11)

## 2024-01-19 PROCEDURE — 85027 COMPLETE CBC AUTOMATED: CPT | Performed by: INTERNAL MEDICINE

## 2024-01-19 PROCEDURE — 80053 COMPREHEN METABOLIC PANEL: CPT | Performed by: INTERNAL MEDICINE

## 2024-01-19 PROCEDURE — 80061 LIPID PANEL: CPT | Performed by: INTERNAL MEDICINE

## 2024-01-19 PROCEDURE — 36415 COLL VENOUS BLD VENIPUNCTURE: CPT | Performed by: INTERNAL MEDICINE

## 2024-01-19 PROCEDURE — 99396 PREV VISIT EST AGE 40-64: CPT | Performed by: INTERNAL MEDICINE

## 2024-01-19 RX ORDER — ROSUVASTATIN CALCIUM 40 MG/1
40 TABLET, COATED ORAL DAILY
Qty: 90 TABLET | Refills: 3 | Status: SHIPPED | OUTPATIENT
Start: 2024-01-19

## 2024-01-19 RX ORDER — CARVEDILOL 12.5 MG/1
18.75 TABLET ORAL 2 TIMES DAILY
Qty: 270 TABLET | Refills: 3 | Status: SHIPPED | OUTPATIENT
Start: 2024-01-19

## 2024-01-19 RX ORDER — LISINOPRIL 20 MG/1
20 TABLET ORAL 2 TIMES DAILY
Qty: 180 TABLET | Refills: 3 | Status: SHIPPED | OUTPATIENT
Start: 2024-01-19

## 2024-01-19 ASSESSMENT — PAIN SCALES - GENERAL: PAINLEVEL: NO PAIN (0)

## 2024-01-19 NOTE — PROGRESS NOTES
Preventive Care Visit  [facility] GLORY Edge MD, Internal Medicine  Jan 19, 2024      SUBJECTIVE:   Chacorta is a 48 year old, presenting for the following:    He is doing very well and has no complaints.  He works out about 3 days a week on the [employer].  He is  and has 3 kids ages 15, 13 and 10.  No depression issues and he has regular [facility] follow-up for his tongue cancer.  No anxiety issues.    No chief complaint on file.      Healthy Habits:     Getting at least 3 servings of Calcium per day:  Yes    Bi-annual eye exam:  NO    Dental care twice a year:  Yes    Sleep apnea or symptoms of sleep apnea:  None    Diet:  Low salt and Low fat/cholesterol    Frequency of exercise:  2-3 days/week    Duration of exercise:  30-45 minutes    Taking medications regularly:  Yes    Medication side effects:  None    Additional concerns today:  No    Today's PHQ-9 Score:       1/18/2024     9:39 AM   PHQ-9 SCORE   PHQ-9 Total Score MyChart 0   PHQ-9 Total Score 0                Past Medical History:      Past Medical History:   Diagnosis Date    Anemia 2019    nl iron, folic acid, spep, b12, haptoglobin, seen by heme and back to nl, no fu needed    Anxiety and depression 2004    Dr. Nicole Soler    ASCVD (arteriosclerotic cardiovascular disease) 06/2019    stemi, emergent angio done with 90% mid rca very large vessel, ptca and stented, 2nd marginal 30%, ostial 30%, mid lad 50% sequential lesions, nl ef, possible outflow obstruction on echo    Hypertension 05/2019    Special screening for malignant neoplasms, colon 07/2023    nl    Tongue cancer (H) 05/2021    resected at [place]             Past Surgical History:      Past Surgical History:   Procedure Laterality Date    BIOPSY      COLONOSCOPY N/A 7/27/2023    Procedure: Colonoscopy;  Surgeon: Karina Gustafson MD;  Location:  GI    CV CORONARY ANGIOGRAM N/A 5/31/2019    Procedure: Coronary Angiogram;  Surgeon: Baltazar Gómez MD;   Location:  HEART CARDIAC CATH LAB    CV LEFT HEART CATH N/A 5/31/2019    Procedure: Left Heart Cath;  Surgeon: Baltazar Gómez MD;  Location:  HEART CARDIAC CATH LAB    CV LEFT VENTRICULOGRAM N/A 5/31/2019    Procedure: Left Ventriculogram;  Surgeon: Baltazar Gómez MD;  Location:  HEART CARDIAC CATH LAB    CV PCI STENT DRUG ELUTING N/A 5/31/2019    Procedure: PCI Stent Drug Eluting;  Surgeon: Baltazar Gómez MD;  Location:  HEART CARDIAC CATH LAB    HEAD & NECK SURGERY  5/3/21    VASCULAR SURGERY  May 2019    Stent    wisdom teeth removed  college             Social History:     Social History     Socioeconomic History    Marital status:      Spouse name: Not on file    Number of children: 3    Years of education: Not on file    Highest education level: Not on file   Occupational History    Occupation:    Tobacco Use    Smoking status: Never    Smokeless tobacco: Never   Substance and Sexual Activity    Alcohol use: Yes     Comment: occasional    Drug use: No    Sexual activity: Yes     Partners: Female     Birth control/protection: I.U.D.   Other Topics Concern    Parent/sibling w/ CABG, MI or angioplasty before 65F 55M? No   Social History Narrative    Not on file     Social Determinants of Health     Financial Resource Strain: Low Risk  (1/18/2024)    Financial Resource Strain     Within the past 12 months, have you or your family members you live with been unable to get utilities (heat, electricity) when it was really needed?: No   Food Insecurity: Low Risk  (1/18/2024)    Food Insecurity     Within the past 12 months, did you worry that your food would run out before you got money to buy more?: No     Within the past 12 months, did the food you bought just not last and you didn t have money to get more?: No   Transportation Needs: Low Risk  (1/18/2024)    Transportation Needs     Within the past 12 months, has lack of transportation kept you from medical appointments,  "getting your medicines, non-medical meetings or appointments, work, or from getting things that you need?: No   Physical Activity: Not on file   Stress: Not on file   Social Connections: Not on file   Interpersonal Safety: Not on file   Housing Stability: High Risk (1/18/2024)    Housing Stability     Do you have housing? : No     Are you worried about losing your housing?: No             Family History:   reviewed         Allergies:   No Known Allergies          Medications:     Current Outpatient Medications   Medication Sig Dispense Refill    aspirin (ASA) 81 MG EC tablet Take 1 tablet (81 mg) by mouth daily      carvedilol (COREG) 12.5 MG tablet Take 1.5 tablets (18.75 mg) by mouth 2 times daily 270 tablet 3    escitalopram (LEXAPRO) 20 MG tablet Take 1 tablet (20 mg) by mouth daily      lisinopril (ZESTRIL) 20 MG tablet Take 1 tablet (20 mg) by mouth 2 times daily 180 tablet 3    nitroGLYcerin (NITROSTAT) 0.4 MG sublingual tablet For chest pain place 1 tablet under the tongue every 5 minutes for 3 doses. If symptoms persist 5 minutes after 1st dose call 911. 25 tablet 0    rosuvastatin (CRESTOR) 40 MG tablet Take 1 tablet (40 mg) by mouth daily 90 tablet 3               Review of Systems:     The 10 point Review of Systems is negative other than noted in the HPI           Physical Exam:   Blood pressure 134/88, pulse 64, temperature 97.7  F (36.5  C), temperature source Temporal, resp. rate 16, height 1.778 m (5' 10\"), weight 104.3 kg (230 lb), SpO2 98%.    Exam:  Constitutional: healthy appearing, alert and in no distress  Heent: Normocephalic. Head without obvious masses or lesions. PERRLDC, EOMI. Mouth exam within normal limits: tongue, mucous membranes, posterior pharynx all normal, no lesions or abnormalities seen.  Tm's and canals within normal limits bilaterally. Neck supple, no nuchal rigidity or masses. No supraclavicular, or cervical adenopathy. Thyroid symmetric, no masses.  Cardiovascular: Regular " rate and rhythm, no murmer, rub or gallops.  JVP not elevated, no edema.  Carotids within normal limits bilaterally, no bruits.  Respiratory: Normal respiratory effort.  Lungs clear, normal flow, no wheezing or crackles.  Breasts: Normal bilaterally.  No masses or lesions.  Nipples within normal limites.  No axillary lesions or nodes.  Gastrointestinal: Normal active bowel sounds.   Soft, not tender, no masses, guarding or rebound.  No hepatosplenomegaly.   Genitourinary: Rectal not done  Musculoskeletal: extremities normal, no gross deformities noted.  Skin: no suspicious lesions or rashes   Neurologic: Mental status within normal limits.  Speech fluent.  No gross motor abnormalities and gait intact.  Psychiatric: mentation appears normal and affect normal.         Data:   Labs sent        Assessment:   Normal complete physical exam  Ascvd, med mgmt  Tongue ca, siva, follow up [place]  Anemia, no issues  Anxiety and depression, doing super  Hypertension, control fair  hcm         Plan:   Up to date immunizations, colon  Follow-up [facility]  I stressed the need to exercise more and get his weight down and monitor his blood pressure  Letter with labs      Hamilton Edge M.D.

## 2024-01-19 NOTE — LETTER
January 22, 2024      Chacorta Thom Lazaro  [address] [facility] [address]        Dear ,    We are writing to inform you of your test results.    Chacorta,     It was a pleasure seeing you for your physical examination.  I wanted to get back to you with your test results.  I have enclosed a copy for your review.      I am happy to report that your cbc or complete blood count is normal with no signs of anemia, leukemia or platelet abnormalities.  Your chemistry panel shows no diabetes.  Your blood salts, kidney tests, liver tests, and proteins are all fine.  The elevated bilirubin is benign.     Your total cholesterol is 116 with the normal range being below 200.  Your HDL or good cholesterol is 42 with the normal range being above 40.  Your LDL or bad cholesterol is 54 with the normal range being below 130.  These numbers are very good.     I am happy to bring you this excellent report.  Please let me know if you have questions.     Resulted Orders   CBC with platelets   Result Value Ref Range    WBC Count 4.5 4.0 - 11.0 10e3/uL    RBC Count 5.01 4.40 - 5.90 10e6/uL    Hemoglobin 14.7 13.3 - 17.7 g/dL    Hematocrit 43.0 40.0 - 53.0 %    MCV 86 78 - 100 fL    MCH 29.3 26.5 - 33.0 pg    MCHC 34.2 31.5 - 36.5 g/dL    RDW 11.5 10.0 - 15.0 %    Platelet Count 208 150 - 450 10e3/uL   Comprehensive metabolic panel   Result Value Ref Range    Sodium 139 135 - 145 mmol/L      Comment:      Reference intervals for this test were updated on 09/26/2023 to more accurately reflect our healthy population. There may be differences in the flagging of prior results with similar values performed with this method. Interpretation of those prior results can be made in the context of the updated reference intervals.     Potassium 4.2 3.4 - 5.3 mmol/L    Carbon Dioxide (CO2) 26 22 - 29 mmol/L    Anion Gap 9 7 - 15 mmol/L    Urea Nitrogen 16.4 6.0 - 20.0 mg/dL    Creatinine 1.05 0.67 - 1.17 mg/dL    GFR Estimate 89 >60 mL/min/1.73m2     Calcium 9.4 8.6 - 10.0 mg/dL    Chloride 104 98 - 107 mmol/L    Glucose 101 (H) 70 - 99 mg/dL    Alkaline Phosphatase 68 40 - 150 U/L      Comment:      Reference intervals for this test were updated on 11/14/2023 to more accurately reflect our healthy population. There may be differences in the flagging of prior results with similar values performed with this method. Interpretation of those prior results can be made in the context of the updated reference intervals.    AST 28 0 - 45 U/L      Comment:      Reference intervals for this test were updated on 6/12/2023 to more accurately reflect our healthy population. There may be differences in the flagging of prior results with similar values performed with this method. Interpretation of those prior results can be made in the context of the updated reference intervals.    ALT 24 0 - 70 U/L      Comment:      Reference intervals for this test were updated on 6/12/2023 to more accurately reflect our healthy population. There may be differences in the flagging of prior results with similar values performed with this method. Interpretation of those prior results can be made in the context of the updated reference intervals.      Protein Total 7.3 6.4 - 8.3 g/dL    Albumin 4.3 3.5 - 5.2 g/dL    Bilirubin Total 1.5 (H) <=1.2 mg/dL   Lipid panel reflex to direct LDL Fasting   Result Value Ref Range    Cholesterol 116 <200 mg/dL    Triglycerides 102 <150 mg/dL    Direct Measure HDL 42 >=40 mg/dL    LDL Cholesterol Calculated 54 <=100 mg/dL    Non HDL Cholesterol 74 <130 mg/dL    Patient Fasting > 8hrs? Yes     Narrative    Cholesterol  Desirable:  <200 mg/dL    Triglycerides  Normal:  Less than 150 mg/dL  Borderline High:  150-199 mg/dL  High:  200-499 mg/dL  Very High:  Greater than or equal to 500 mg/dL    Direct Measure HDL  Female:  Greater than or equal to 50 mg/dL   Male:  Greater than or equal to 40 mg/dL    LDL Cholesterol  Desirable:  <100mg/dL  Above Desirable:   100-129 mg/dL   Borderline High:  130-159 mg/dL   High:  160-189 mg/dL   Very High:  >= 190 mg/dL    Non HDL Cholesterol  Desirable:  130 mg/dL  Above Desirable:  130-159 mg/dL  Borderline High:  160-189 mg/dL  High:  190-219 mg/dL  Very High:  Greater than or equal to 220 mg/dL       If you have any questions or concerns, please call the clinic at the number listed above.       Sincerely,      Hamilton Edge MD

## 2024-01-20 LAB
ALBUMIN SERPL BCG-MCNC: 4.3 G/DL (ref 3.5–5.2)
ALP SERPL-CCNC: 68 U/L (ref 40–150)
ALT SERPL W P-5'-P-CCNC: 24 U/L (ref 0–70)
ANION GAP SERPL CALCULATED.3IONS-SCNC: 9 MMOL/L (ref 7–15)
AST SERPL W P-5'-P-CCNC: 28 U/L (ref 0–45)
BILIRUB SERPL-MCNC: 1.5 MG/DL
BUN SERPL-MCNC: 16.4 MG/DL (ref 6–20)
CALCIUM SERPL-MCNC: 9.4 MG/DL (ref 8.6–10)
CHLORIDE SERPL-SCNC: 104 MMOL/L (ref 98–107)
CHOLEST SERPL-MCNC: 116 MG/DL
CREAT SERPL-MCNC: 1.05 MG/DL (ref 0.67–1.17)
DEPRECATED HCO3 PLAS-SCNC: 26 MMOL/L (ref 22–29)
EGFRCR SERPLBLD CKD-EPI 2021: 89 ML/MIN/1.73M2
FASTING STATUS PATIENT QL REPORTED: YES
GLUCOSE SERPL-MCNC: 101 MG/DL (ref 70–99)
HDLC SERPL-MCNC: 42 MG/DL
LDLC SERPL CALC-MCNC: 54 MG/DL
NONHDLC SERPL-MCNC: 74 MG/DL
POTASSIUM SERPL-SCNC: 4.2 MMOL/L (ref 3.4–5.3)
PROT SERPL-MCNC: 7.3 G/DL (ref 6.4–8.3)
SODIUM SERPL-SCNC: 139 MMOL/L (ref 135–145)
TRIGL SERPL-MCNC: 102 MG/DL

## 2024-01-20 NOTE — RESULT ENCOUNTER NOTE
Chacorta,    It was a pleasure seeing you for your physical examination.  I wanted to get back to you with your test results.  I have enclosed a copy for your review.      I am happy to report that your cbc or complete blood count is normal with no signs of anemia, leukemia or platelet abnormalities.  Your chemistry panel shows no diabetes.  Your blood salts, kidney tests, liver tests, and proteins are all fine.  The elevated bilirubin is benign.    Your total cholesterol is 116 with the normal range being below 200.  Your HDL or good cholesterol is 42 with the normal range being above 40.  Your LDL or bad cholesterol is 54 with the normal range being below 130.  These numbers are very good.    I am happy to bring you this excellent report.  Please let me know if you have questions.

## (undated) DEVICE — MANIFOLD KIT ANGIO AUTOMATED 014613

## (undated) DEVICE — CATH BALLOON EMERGE 4.0X20MM H7493918920400

## (undated) DEVICE — INFL DVC KIT W/10CC NITRO IN4530

## (undated) DEVICE — CATH LAUNCHER 6FR JR 4.0 LA6JR40

## (undated) DEVICE — CATH ANGIO JUDKINS R4 6FRX100CM INFINITI 534621T

## (undated) DEVICE — CATH ANGIO INFINITI PIGTAIL 145 6 SH 6FRX110CM  534-652S

## (undated) DEVICE — KIT HAND CONTROL ANGIOTOUCH ACIST 65CM AT-P65

## (undated) DEVICE — DEFIB PRO-PADZ LVP LQD GEL ADULT 8900-2105-01

## (undated) DEVICE — GUIDEWIRE VASC 0.014INX190CM J TIP CGRXT190HJ

## (undated) DEVICE — CATH ANGIO JUDKINS JL4 6FRX100CM INFINITI 534620T

## (undated) DEVICE — INTRO GLIDESHEATH SLENDER 6FR 10X45CM 60-1060

## (undated) DEVICE — TOTE ANGIO CORP PC15AT SAN32CC83O

## (undated) DEVICE — SLEEVE TR BAND RADIAL COMPRESSION DEVICE 24CM TRB24-REG

## (undated) RX ORDER — FENTANYL CITRATE 50 UG/ML
INJECTION, SOLUTION INTRAMUSCULAR; INTRAVENOUS
Status: DISPENSED
Start: 2019-05-31

## (undated) RX ORDER — LIDOCAINE HYDROCHLORIDE 10 MG/ML
INJECTION, SOLUTION EPIDURAL; INFILTRATION; INTRACAUDAL; PERINEURAL
Status: DISPENSED
Start: 2019-05-31

## (undated) RX ORDER — NITROGLYCERIN 5 MG/ML
VIAL (ML) INTRAVENOUS
Status: DISPENSED
Start: 2019-05-31

## (undated) RX ORDER — METOPROLOL TARTRATE 1 MG/ML
INJECTION, SOLUTION INTRAVENOUS
Status: DISPENSED
Start: 2019-05-31

## (undated) RX ORDER — HEPARIN SODIUM 1000 [USP'U]/ML
INJECTION, SOLUTION INTRAVENOUS; SUBCUTANEOUS
Status: DISPENSED
Start: 2019-05-31

## (undated) RX ORDER — VERAPAMIL HYDROCHLORIDE 2.5 MG/ML
INJECTION, SOLUTION INTRAVENOUS
Status: DISPENSED
Start: 2019-05-31

## (undated) RX ORDER — FENTANYL CITRATE 50 UG/ML
INJECTION, SOLUTION INTRAMUSCULAR; INTRAVENOUS
Status: DISPENSED
Start: 2023-07-27